# Patient Record
Sex: FEMALE | Race: BLACK OR AFRICAN AMERICAN | NOT HISPANIC OR LATINO | Employment: UNEMPLOYED | URBAN - METROPOLITAN AREA
[De-identification: names, ages, dates, MRNs, and addresses within clinical notes are randomized per-mention and may not be internally consistent; named-entity substitution may affect disease eponyms.]

---

## 2017-04-07 PROCEDURE — 99284 EMERGENCY DEPT VISIT MOD MDM: CPT

## 2017-04-08 ENCOUNTER — HOSPITAL ENCOUNTER (EMERGENCY)
Facility: HOSPITAL | Age: 42
Discharge: HOME | End: 2017-04-08
Attending: EMERGENCY MEDICINE
Payer: MEDICAID

## 2017-04-08 VITALS
HEIGHT: 65 IN | WEIGHT: 180 LBS | RESPIRATION RATE: 16 BRPM | HEART RATE: 72 BPM | BODY MASS INDEX: 29.99 KG/M2 | TEMPERATURE: 98 F | DIASTOLIC BLOOD PRESSURE: 68 MMHG | OXYGEN SATURATION: 100 % | SYSTOLIC BLOOD PRESSURE: 141 MMHG

## 2017-04-08 DIAGNOSIS — R60.9 SWELLING: ICD-10-CM

## 2017-04-08 DIAGNOSIS — M54.32 SCIATICA OF LEFT SIDE: Primary | ICD-10-CM

## 2017-04-08 PROCEDURE — 63600175 PHARM REV CODE 636 W HCPCS: Performed by: EMERGENCY MEDICINE

## 2017-04-08 RX ORDER — METHYLPREDNISOLONE 4 MG/1
TABLET ORAL
Qty: 1 PACKAGE | Refills: 0 | Status: SHIPPED | OUTPATIENT
Start: 2017-04-08 | End: 2017-04-16

## 2017-04-08 RX ORDER — PREDNISONE 20 MG/1
60 TABLET ORAL
Status: COMPLETED | OUTPATIENT
Start: 2017-04-08 | End: 2017-04-08

## 2017-04-08 RX ORDER — HYDROCODONE BITARTRATE AND ACETAMINOPHEN 10; 325 MG/1; MG/1
TABLET ORAL
COMMUNITY
End: 2017-04-16

## 2017-04-08 RX ADMIN — PREDNISONE 60 MG: 20 TABLET ORAL at 03:04

## 2017-04-08 NOTE — ED NOTES
Patient complaints of left lower back pain radiating down leg for several days, worse today.  Patient states when she stands her left knee swells and pain increases to calf area.  No shortness of breath, no chest pain.  No swelling at present time.

## 2017-04-08 NOTE — ED PROVIDER NOTES
"Encounter Date: 4/7/2017       History     Chief Complaint   Patient presents with    Leg Swelling     LLE swelling and pain x 2 days with no history of trauma.      Review of patient's allergies indicates:   Allergen Reactions    Compazine [prochlorperazine] Other (See Comments)     "I had a stroke-like reaction"     HPI Comments: 41-year-old female with a history of sciatica comes in with pain that started in her left calf radiated up into her left knee and up into her left thigh.  She describes the pain as aching, and burning.  She does have a history of sciatica.  Her sister had a blood clot, which got her worried that she might have a DVT.  She denies shortness of breath chest pain or hemoptysis.  She has no medical problems, and does not take any medicines on a regular basis.    Patient is a 41 y.o. female presenting with the following complaint: leg pain. The history is provided by the patient.   Leg Pain    There was no injury mechanism. The pain is present in the left leg. The quality of the pain is described as aching and burning. The pain is at a severity of 2/10. The pain has been constant since onset. Pertinent negatives include no inability to bear weight. She reports no foreign bodies present.     Past Medical History:   Diagnosis Date    Chronic back pain      History reviewed. No pertinent surgical history.  History reviewed. No pertinent family history.  Social History   Substance Use Topics    Smoking status: Current Every Day Smoker    Smokeless tobacco: None    Alcohol use Yes     Review of Systems   Eyes: Negative.    Respiratory: Negative.    Endocrine: Negative.    All other systems reviewed and are negative.      Physical Exam   Initial Vitals   BP Pulse Resp Temp SpO2   04/08/17 0008 04/08/17 0008 04/08/17 0008 04/08/17 0008 04/08/17 0008   155/84 90 16 98.7 °F (37.1 °C) 97 %     Physical Exam    Nursing note and vitals reviewed.  Constitutional: She appears well-developed and " well-nourished. She appears distressed.   HENT:   Head: Normocephalic and atraumatic.   Eyes: EOM are normal. Pupils are equal, round, and reactive to light.   Neck: Normal range of motion. Neck supple.   Cardiovascular: Normal rate and normal heart sounds.   Pulmonary/Chest: Breath sounds normal.   Abdominal: Soft.   Musculoskeletal: Normal range of motion.   She has neural tension signs.  She does have 5 out of 5 strength with hip flexion knee extension and ankle dorsiflexion and EHL.  She is able to walk on her tiptoes though it is painful.   Neurological: She is alert and oriented to person, place, and time. She has normal strength. No cranial nerve deficit.   Skin: Skin is warm and dry.         ED Course   Procedures  Labs Reviewed - No data to display          Medical Decision Making:   Initial Assessment:   41-year-old female with left leg pain, she was concern for DVT there is no evidence of cellulitis or swelling but she does have signs consistent with sciatica  Differential Diagnosis:   Sciatica no evidence of DVT or cellulitis  ED Management:  Is given a Medrol Dosepak, physical therapy and an outpatient referral.  I discussed with her negative ultrasound.  She expressed understanding and willingness compartment recommendations for medications physical therapy and outpatient follow-up.                   ED Course     Clinical Impression:   The primary encounter diagnosis was Sciatica of left side. A diagnosis of Swelling was also pertinent to this visit.          Elizabeth Garza MD  04/08/17 0252

## 2017-04-08 NOTE — DISCHARGE INSTRUCTIONS
Causes of Lumbar (Low Back) Pain  Low back pain can be caused by problems with any part of the lumbar spine. A disk can herniate (push out) and press on a nerve. Vertebrae can rub against each other or slip out of place. This can irritate facet joints and nerves. It can also lead to stenosis, a narrowing of the spinal canal or foramen.  Pressure from a disk  Constant wear and tear on a disk can cause it to weaken and push outward. Part of the disk may then press on nearby nerves. There are two common types of herniated disks:  Contained means the soft nucleus is protruding outward.   Extruded means the firm annulus has torn, letting the soft center squeeze through.     Pressure from bone  An unstable spine   With age, a disk may thin and wear out. Vertebrae above and below the disk may begin to touch. This can put pressure on nerves. It can also cause bone spurs (growths) to form where the bones rub together.    Stenosis results when bone spurs narrow the foramen or spinal canal. This also puts pressure on nerves. Slipping vertebrae can irritate nerves and joints. They can also worsen stenosis.    In some cases, vertebrae become unstable and slip forward. This is called spondylolisthesis.     Date Last Reviewed: 10/12/2015  © 7805-7451 [employer]. [address] [place] [address], [place] [address]. All rights reserved. This information is not intended as a substitute for professional medical care. Always follow your healthcare professional's instructions.

## 2017-04-08 NOTE — ED AVS SNAPSHOT
[facility]  [address] [place] [address]               Fani Calvo   2017  1:44 AM   ED    Description:  Female : 1975   Department:  [facility]           Your Care was Coordinated By:     Provider Role From To    Elizabeth Garza MD Attending Provider 17 0240 --      Reason for Visit     Leg Swelling           Diagnoses this Visit        Comments    Sciatica of left side    -  Primary     Swelling           ED Disposition     None           To Do List           Follow-up Information     Follow up with [facility] In 1 week.    Specialty:  Family Medicine    Contact information:    [address], [address]  [facility] [address] [phone]       These Medications        Disp Refills Start End    methylPREDNISolone (MEDROL DOSEPACK) 4 mg tablet 1 Package 0 2017    Take as prescribed      [facility]     [facility] Nurse Care Line -  Assistance  Unless otherwise directed by your provider, please contact [facility], our nurse care line that is available for  assistance.     Registered nurses in the [facility] provide: appointment scheduling, clinical advisement, health education, and other advisory services.  Call: [phone] (toll free)               Medications           Message regarding Medications     Verify the changes and/or additions to your medication regime listed below are the same as discussed with your clinician today.  If any of these changes or additions are incorrect, please notify your healthcare provider.        START taking these NEW medications        Refills    methylPREDNISolone (MEDROL DOSEPACK) 4 mg tablet 0    Sig: Take as prescribed    Class: Print           Verify that the below list of medications is an accurate representation of the medications you are currently taking.  If none reported, the list may be blank.  "If incorrect, please contact your healthcare provider. Carry this list with you in case of emergency.           Current Medications     hydrocodone-acetaminophen 10-325mg (NORCO)  mg Tab Take by mouth.    ibuprofen (ADVIL,MOTRIN) 800 MG tablet Take 1 tablet (800 mg total) by mouth every 6 (six) hours as needed (mild pain).    methylPREDNISolone (MEDROL DOSEPACK) 4 mg tablet Take as prescribed           Clinical Reference Information           Your Vitals Were     BP Pulse Temp Resp Height Weight    155/84 (BP Location: Right arm, Patient Position: Sitting) 90 98.7 °F (37.1 °C) (Oral) 16 5' 5" (1.651 m) 81.6 kg (180 lb)    Last Period SpO2 BMI          03/10/2017 97% 29.95 kg/m2        Allergies as of 4/8/2017        Reactions    Compazine [Prochlorperazine] Other (See Comments)    "I had a stroke-like reaction"      Immunizations Administered on Date of Encounter - 4/8/2017     None      ED Micro, Lab, POCT     None      ED Imaging Orders     Start Ordered       Status Ordering Provider    04/08/17 0033 04/08/17 0032  US Lower Extremity Veins Left  1 time imaging      Final result         Discharge Instructions         Causes of Lumbar (Low Back) Pain  Low back pain can be caused by problems with any part of the lumbar spine. A disk can herniate (push out) and press on a nerve. Vertebrae can rub against each other or slip out of place. This can irritate facet joints and nerves. It can also lead to stenosis, a narrowing of the spinal canal or foramen.  Pressure from a disk  Constant wear and tear on a disk can cause it to weaken and push outward. Part of the disk may then press on nearby nerves. There are two common types of herniated disks:  Contained means the soft nucleus is protruding outward.   Extruded means the firm annulus has torn, letting the soft center squeeze through.     Pressure from bone  An unstable spine   With age, a disk may thin and wear out. Vertebrae above and below the disk may begin to " touch. This can put pressure on nerves. It can also cause bone spurs (growths) to form where the bones rub together.    Stenosis results when bone spurs narrow the foramen or spinal canal. This also puts pressure on nerves. Slipping vertebrae can irritate nerves and joints. They can also worsen stenosis.    In some cases, vertebrae become unstable and slip forward. This is called spondylolisthesis.     Date Last Reviewed: 10/12/2015  © 7692-8563 [employer]. [address] [place] [address], [place] [address]. All rights reserved. This information is not intended as a substitute for professional medical care. Always follow your healthcare professional's instructions.          [facility] Sign-Up     Activating your [facility] account is as easy as 1-2-3!     1) Visit [email or web address], select Sign Up Now, enter this activation code and your date of birth, then select Next.  [email or web address]  Expires: 5/23/2017  2:48 AM      2) Create a username and password to use when you visit [facility] in the future and select a security question in case you lose your password and select Next.    3) Enter your e-mail address and click Sign Up!    Additional Information  If you have questions, please e-mail [email or web address] or call [phone] to talk to our [facility] staff. Remember, [facility] is NOT to be used for urgent needs. For medical emergencies, dial 911.         Smoking Cessation     If you would like to quit smoking:   You may be eligible for free services if you are a [place] resident and started smoking cigarettes before September 1, 1988.  Call the Smoking Cessation Trust (SCT) toll free at (733) 995-5354 or (356) 562-1664.   Call -800-QUIT-NOW if you do not meet the above criteria.   Contact us via email: [email or web address]   View our website for more information: [email or web address]         [facility] complies with applicable Federal civil rights laws and does not  discriminate on the basis of race, color, national origin, age, disability, or sex.        Language Assistance Services     ATTENTION: Language assistance services are available, free of charge. Please call [phone].      ATENCIÓN: Si sari dubois, tiene a mclean disposición servicios gratuitos de asistencia lingüística. Llame al [phone].     CHÚ Ý: N?u b?n nói Ti?ng Vi?t, có các d?ch v? h? tr? ngôn ng? mi?n phí dành cho b?n. G?i s? [phone].

## 2017-04-16 ENCOUNTER — HOSPITAL ENCOUNTER (EMERGENCY)
Facility: OTHER | Age: 42
Discharge: HOME | End: 2017-04-16
Attending: EMERGENCY MEDICINE
Payer: MEDICAID

## 2017-04-16 VITALS
RESPIRATION RATE: 18 BRPM | HEART RATE: 88 BPM | OXYGEN SATURATION: 96 % | TEMPERATURE: 98 F | WEIGHT: 180 LBS | HEIGHT: 65 IN | DIASTOLIC BLOOD PRESSURE: 66 MMHG | BODY MASS INDEX: 29.99 KG/M2 | SYSTOLIC BLOOD PRESSURE: 114 MMHG

## 2017-04-16 DIAGNOSIS — R52 PAIN: ICD-10-CM

## 2017-04-16 DIAGNOSIS — M79.605 LEFT LEG PAIN: ICD-10-CM

## 2017-04-16 DIAGNOSIS — M54.42 ACUTE MIDLINE LOW BACK PAIN WITH LEFT-SIDED SCIATICA: Primary | ICD-10-CM

## 2017-04-16 LAB
B-HCG UR QL: NEGATIVE
CTP QC/QA: YES

## 2017-04-16 PROCEDURE — 25000003 PHARM REV CODE 250: Performed by: EMERGENCY MEDICINE

## 2017-04-16 PROCEDURE — 63600175 PHARM REV CODE 636 W HCPCS: Performed by: EMERGENCY MEDICINE

## 2017-04-16 PROCEDURE — 96372 THER/PROPH/DIAG INJ SC/IM: CPT

## 2017-04-16 PROCEDURE — 99284 EMERGENCY DEPT VISIT MOD MDM: CPT | Mod: 25

## 2017-04-16 PROCEDURE — 81025 URINE PREGNANCY TEST: CPT | Performed by: EMERGENCY MEDICINE

## 2017-04-16 RX ORDER — OXYCODONE AND ACETAMINOPHEN 5; 325 MG/1; MG/1
1 TABLET ORAL
Status: COMPLETED | OUTPATIENT
Start: 2017-04-16 | End: 2017-04-16

## 2017-04-16 RX ORDER — CYCLOBENZAPRINE HCL 10 MG
10 TABLET ORAL
Status: COMPLETED | OUTPATIENT
Start: 2017-04-16 | End: 2017-04-16

## 2017-04-16 RX ORDER — OXYCODONE AND ACETAMINOPHEN 5; 325 MG/1; MG/1
1 TABLET ORAL EVERY 4 HOURS PRN
Qty: 12 TABLET | Refills: 0 | Status: SHIPPED | OUTPATIENT
Start: 2017-04-16 | End: 2017-04-23 | Stop reason: ALTCHOICE

## 2017-04-16 RX ORDER — KETOROLAC TROMETHAMINE 30 MG/ML
15 INJECTION, SOLUTION INTRAMUSCULAR; INTRAVENOUS
Status: COMPLETED | OUTPATIENT
Start: 2017-04-16 | End: 2017-04-16

## 2017-04-16 RX ORDER — HYDROMORPHONE HYDROCHLORIDE 1 MG/ML
1 INJECTION, SOLUTION INTRAMUSCULAR; INTRAVENOUS; SUBCUTANEOUS
Status: COMPLETED | OUTPATIENT
Start: 2017-04-16 | End: 2017-04-16

## 2017-04-16 RX ORDER — IBUPROFEN 600 MG/1
600 TABLET ORAL EVERY 6 HOURS PRN
Qty: 20 TABLET | Refills: 0 | Status: SHIPPED | OUTPATIENT
Start: 2017-04-16 | End: 2017-10-25

## 2017-04-16 RX ORDER — CYCLOBENZAPRINE HCL 10 MG
10 TABLET ORAL 3 TIMES DAILY PRN
Qty: 15 TABLET | Refills: 0 | Status: SHIPPED | OUTPATIENT
Start: 2017-04-16 | End: 2017-04-21

## 2017-04-16 RX ADMIN — HYDROMORPHONE HYDROCHLORIDE 1 MG: 1 INJECTION, SOLUTION INTRAMUSCULAR; INTRAVENOUS; SUBCUTANEOUS at 04:04

## 2017-04-16 RX ADMIN — CYCLOBENZAPRINE HYDROCHLORIDE 10 MG: 10 TABLET, FILM COATED ORAL at 03:04

## 2017-04-16 RX ADMIN — OXYCODONE HYDROCHLORIDE AND ACETAMINOPHEN 1 TABLET: 5; 325 TABLET ORAL at 03:04

## 2017-04-16 RX ADMIN — KETOROLAC TROMETHAMINE 15 MG: 30 INJECTION, SOLUTION INTRAMUSCULAR at 03:04

## 2017-04-16 NOTE — ED PROVIDER NOTES
"Encounter Date: 4/16/2017    SCRIBE #1 NOTE: I, Marc Zabrina, am scribing for, and in the presence of, Dr. Rosa.       History     Chief Complaint   Patient presents with    Leg Pain     L leg x1 week, visity to [facility] last thurs, reports no relief from syptoms     Review of patient's allergies indicates:   Allergen Reactions    Compazine [prochlorperazine] Other (See Comments)     "I had a stroke-like reaction"     HPI Comments: Time seen by provider: 2:55 PM    This is a 41 y.o. female who presents to the ED with a chief complaint of left lower extremity pain. The patient reports that she was seen in the ED and dx with sciatica 8 days ago at onset and discharged on a medrol dose pack. She reports that her pain has been persistent in that time. She states that it was initially to her calf, but now is to the back of her calf radiating up her posterior leg up to her buttock. She describes it as a constant shooting and burning pain rated at a 10/10 in severity. She notes some associated intermittent numbness and burning to her left foot. She denies any injury, trauma, or falls. She reports no relief with steroid or aleve use. She states that her left lower extremity is mildly swollen     The patient denies any fecal or urinary incontinence, abdominal pain, fever, or chills.     She denies any HTN, DM, or asthma. She reports no drug use. She denies any past surgical hx.     The history is provided by the patient.     Past Medical History:   Diagnosis Date    Chronic back pain      History reviewed. No pertinent surgical history.  History reviewed. No pertinent family history.  Social History   Substance Use Topics    Smoking status: Current Every Day Smoker    Smokeless tobacco: None    Alcohol use Yes     Review of Systems   Constitutional: Negative for chills and fever.   HENT: Negative for sore throat.    Respiratory: Negative for shortness of breath.    Cardiovascular: Negative for chest pain. "   Gastrointestinal: Negative for abdominal pain and nausea.   Genitourinary: Negative for dysuria.   Musculoskeletal: Negative for back pain.        Positive for left lower extremity pain.   Skin: Negative for rash.   Neurological: Positive for numbness (intermittently to left foot). Negative for weakness.        Negative for fecal or urinary incontinence.   Hematological: Does not bruise/bleed easily.       Physical Exam   Initial Vitals   BP Pulse Resp Temp SpO2   04/16/17 1451 04/16/17 1451 04/16/17 1451 04/16/17 1451 04/16/17 1451   122/90 107 18 98.3 °F (36.8 °C) 99 %     Physical Exam    Nursing note and vitals reviewed.  Constitutional: She appears well-developed and well-nourished. She is not diaphoretic. No distress.   HENT:   Head: Normocephalic and atraumatic.   Right Ear: External ear normal.   Left Ear: External ear normal.   Nose: Nose normal.   Mouth/Throat: Oropharynx is clear and moist.   Eyes: Conjunctivae and EOM are normal. Pupils are equal, round, and reactive to light. Right eye exhibits no discharge. Left eye exhibits no discharge.   Neck: Normal range of motion.   Cardiovascular: Normal rate, regular rhythm and normal heart sounds. Exam reveals no gallop and no friction rub.    No murmur heard.  Pulmonary/Chest: Breath sounds normal. No respiratory distress. She has no wheezes. She has no rhonchi. She has no rales.   Abdominal: Soft. There is no tenderness. There is no rebound and no guarding.   Musculoskeletal: Normal range of motion.   Point TTP over the left mid buttock area and sacrum. No other spinal TTP.   Left lower extremity: DP pulses are 1+ and equal. Palpable tender cystic area to the popliteal fossa of the left knee. No large effusion. Knee stable with varus and valgus stress. Pain with knee flexion. Diffuse left knee TTP. No erythema or skin changes.    Neurological: She is alert and oriented to person, place, and time. She has normal strength. No cranial nerve deficit or sensory  deficit.   Skin: Skin is warm and dry. No rash and no abscess noted. No erythema. No pallor.   Psychiatric: She has a normal mood and affect. Her behavior is normal. Judgment and thought content normal.         ED Course   Procedures  Labs Reviewed   POCT URINE PREGNANCY         Imaging Results         US Lower Extremity Veins Left (Final result) Result time:  04/16/17 16:09:30    Final result by Aroldo Patel MD (04/16/17 16:09:30)    Impression:        No evidence of deep venous thrombosis in the left lower extremity.        Electronically signed by: AROLDO PATEL MD  Date:     04/16/17  Time:    16:09     Narrative:    Lower extremity US    Time of Procedure: 04/16/17 15:36:39    Accession # [id number]    CLINICAL INDICATION: 41 year old F with   pain    TECHNIQUE: Duplex and color flow Doppler evaluation of the left lower extremity.    COMPARISON: 04/08/2017    FINDINGS:    There is no evidence of acute venous thrombosis in the left common femoral, superficial femoral, greater saphenous, popliteal, peroneal, anterior tibial and posterior tibial veins.            X-Ray Knee 3 View Left (Final result) Result time:  04/16/17 15:42:38    Final result by Aroldo Patel MD (04/16/17 15:42:38)    Impression:          No evidence of fracture or dislocation      Electronically signed by: AROLDO PATEL MD  Date:     04/16/17  Time:    15:42     Narrative:    XR knee    04/16/17 15:30:00    Accession# [id number]    CLINICAL INDICATION: 41 year old F with  pain    TECHNIQUE: 3 views of the left knee.    COMPARISON: Correlation made with tibia fibula radiograph dated 10/03/2011    FINDINGS:    Osseous structures appear intact without evidence of a displaced fracture or destructive lesion.  No evidence of dislocation.  No significant degenerative changes.  Joint spaces are preserved.  No joint effusion.  No focal soft tissue swelling or radiopaque foreign body.                  X-Rays:   Independently Interpreted Readings:    Other Readings:  Left knee x-ray: No fracture, dislocation, or acute process visualized.     Medical Decision Making:   History:   Old Records Summarized: records from clinic visits.       <> Summary of Records: She was seen in the ED and dx with sciatica 8 days ago and discharged on a medrol dose pack.    ED Management:  Emergent evaluation a 41-year-old female with complaint of leg pain buttock pain and back pain.  Description of pain sounds consistent with sciatica, except for out of proportion knee pain.  On exam there is tenderness to the popliteal fossa.  Ultrasound shows no Baker cyst or DVT.  X-ray of the knee shows no fracture or bony lesion.  She was treated with analgesics and muscle relaxers, encouraged close follow-up with PCP or return for worsening.  There was no sign or symptom of cord compression or cauda equina syndrome, and I am comfortable with close follow-up.  She was advised to return for any new or worsening symptoms.            Scribe Attestation:   Scribe #1: I performed the above scribed service and the documentation accurately describes the services I performed. I attest to the accuracy of the note.    Attending Attestation:           Physician Attestation for Scribe:  Physician Attestation Statement for Scribe #1: I, Dr. Rosa, reviewed documentation, as scribed by Marc Gerber in my presence, and it is both accurate and complete.                 ED Course     Clinical Impression:     1. Acute midline low back pain with left-sided sciatica    2. Pain    3. Left leg pain                Joseline Rosa MD  04/19/17 9896

## 2017-04-16 NOTE — ED TRIAGE NOTES
Pt reports with c/o left leg pain and swelling for the past week, visit to [facility] last Thurs where they did an Ultrasound, no other treatment given but pt reports no relief from symptoms since; denies any falls/injuries; pain start around left calf, goes down to foot and back up left leg to buttocks and left lower back;

## 2017-04-16 NOTE — ED AVS SNAPSHOT
[facility]  [address]  [facility] [address]               Fani Calvo   2017  2:52 PM   ED    Description:  Female : 1975   Department:  [facility]           Your Care was Coordinated By:     Provider Role From To    Joseline Rosa MD Attending Provider 17 0273 --      Reason for Visit     Leg Pain           Diagnoses this Visit        Comments    Acute midline low back pain with left-sided sciatica    -  Primary     Pain         Left leg pain           ED Disposition     None           To Do List           Follow-up Information     Schedule an appointment as soon as possible for a visit with Yelena.    Specialties:  Behavioral Health, Psychiatry    Why:  or the primary care doctor or community clinic of your choice    Contact information:    [address]  [facility] [address]  [phone]          Follow up with [facility].    Specialty:  Emergency Medicine    Why:  As needed, If symptoms worsen    Contact information:    [address]  [facility] [id number] [phone]       These Medications        Disp Refills Start End    ibuprofen (ADVIL,MOTRIN) 600 MG tablet 20 tablet 0 2017     Take 1 tablet (600 mg total) by mouth every 6 (six) hours as needed for Pain. - Oral    oxycodone-acetaminophen (PERCOCET) 5-325 mg per tablet 12 tablet 0 2017     Take 1 tablet by mouth every 4 (four) hours as needed for Pain. - Oral    cyclobenzaprine (FLEXERIL) 10 MG tablet 15 tablet 0 2017    Take 1 tablet (10 mg total) by mouth 3 (three) times daily as needed for Muscle spasms. - Oral      [facility]     [facility] Nurse Care [facility] - 24/7 Assistance  Unless otherwise directed by your provider, please contact [facility], our nurse care line that is available for 24/7 assistance.     Registered nurses in the [facility] provide:  appointment scheduling, clinical advisement, health education, and other advisory services.  Call: [phone] (toll free)               Medications           Message regarding Medications     Verify the changes and/or additions to your medication regime listed below are the same as discussed with your clinician today.  If any of these changes or additions are incorrect, please notify your healthcare provider.        START taking these NEW medications        Refills    ibuprofen (ADVIL,MOTRIN) 600 MG tablet 0    Sig: Take 1 tablet (600 mg total) by mouth every 6 (six) hours as needed for Pain.    Class: Print    Route: Oral    oxycodone-acetaminophen (PERCOCET) 5-325 mg per tablet 0    Sig: Take 1 tablet by mouth every 4 (four) hours as needed for Pain.    Class: Print    Route: Oral    cyclobenzaprine (FLEXERIL) 10 MG tablet 0    Sig: Take 1 tablet (10 mg total) by mouth 3 (three) times daily as needed for Muscle spasms.    Class: Print    Route: Oral      These medications were administered today        Dose Freq    ketorolac injection 15 mg 15 mg ED 1 Time    Sig: Inject 15 mg into the muscle ED 1 Time.    Class: Normal    Route: Intramuscular    cyclobenzaprine tablet 10 mg 10 mg ED 1 Time    Sig: Take 1 tablet (10 mg total) by mouth ED 1 Time.    Class: Normal    Route: Oral    oxycodone-acetaminophen 5-325 mg per tablet 1 tablet 1 tablet ED 1 Time    Sig: Take 1 tablet by mouth ED 1 Time.    Class: Normal    Route: Oral    hydromorphone injection 1 mg 1 mg ED 1 Time    Sig: Inject 1 mL (1 mg total) into the muscle ED 1 Time.    Class: Normal    Route: Intramuscular      STOP taking these medications     methylPREDNISolone (MEDROL DOSEPACK) 4 mg tablet Take as prescribed    hydrocodone-acetaminophen 10-325mg (NORCO)  mg Tab Take by mouth.           Verify that the below list of medications is an accurate representation of the medications you are currently taking.  If none reported, the list may be  "blank. If incorrect, please contact your healthcare provider. Carry this list with you in case of emergency.           Current Medications     cyclobenzaprine (FLEXERIL) 10 MG tablet Take 1 tablet (10 mg total) by mouth 3 (three) times daily as needed for Muscle spasms.    ibuprofen (ADVIL,MOTRIN) 600 MG tablet Take 1 tablet (600 mg total) by mouth every 6 (six) hours as needed for Pain.    oxycodone-acetaminophen (PERCOCET) 5-325 mg per tablet Take 1 tablet by mouth every 4 (four) hours as needed for Pain.           Clinical Reference Information           Your Vitals Were     BP Pulse Temp Resp Height Weight    122/90 107 98.3 °F (36.8 °C) (Oral) 18 5' 5" (1.651 m) 81.6 kg (180 lb)    Last Period SpO2 BMI          04/11/2017 99% 29.95 kg/m2        Allergies as of 4/16/2017        Reactions    Compazine [Prochlorperazine] Other (See Comments)    "I had a stroke-like reaction"      Immunizations Administered on Date of Encounter - 4/16/2017     None      ED Micro, Lab, POCT     Start Ordered       Status Ordering Provider    04/16/17 1453 04/16/17 1452  POCT urine pregnancy  Once      Final result       ED Imaging Orders     Start Ordered       Status Ordering Provider    04/16/17 1516 04/16/17 1516  X-Ray Knee 3 View Left  1 time imaging      Final result     04/16/17 1516 04/16/17 1516  US Lower Extremity Veins Left  1 time imaging      Final result       Discharge References/Attachments     BACK PAIN (ACUTE OR CHRONIC) (ENGLISH)    SCIATICA (ENGLISH)      [facility] Sign-Up     Activating your [facility] account is as easy as 1-2-3!     1) Visit [email or web address], select Sign Up Now, enter this activation code and your date of birth, then select Next.  [email or web address]  Expires: 5/23/2017  2:48 AM      2) Create a username and password to use when you visit [facility] in the future and select a security question in case you lose your password and select Next.    3) Enter your e-mail address and click Sign " Up!    Additional Information  If you have questions, please e-mail [email or web address] or call [phone] to talk to our [email or web address] staff. Remember, [facility] is NOT to be used for urgent needs. For medical emergencies, dial 911.         Smoking Cessation     If you would like to quit smoking:   You may be eligible for free services if you are a [place] resident and started smoking cigarettes before September 1, 1988.  Call the Smoking Cessation Trust (RUST) toll free at (014) 002-6997 or (768) 311-3061.   Call 5-568-QUIT-NOW if you do not meet the above criteria.   Contact us via email: [email or web address]   View our website for more information: [email or web address]         [facility] complies with applicable Federal civil rights laws and does not discriminate on the basis of race, color, national origin, age, disability, or sex.        Language Assistance Services     ATTENTION: Language assistance services are available, free of charge. Please call [phone].      ATENCIÓN: Si habla español, tiene a mclean disposición servicios gratuitos de asistencia lingüística. Llame al [phone].     CHÚ Ý: N?u b?n nói Ti?ng Vi?t, có các d?ch v? h? tr? ngôn ng? mi?n phí dành cho b?n. G?i s? [phone].

## 2017-04-23 ENCOUNTER — HOSPITAL ENCOUNTER (EMERGENCY)
Facility: OTHER | Age: 42
Discharge: HOME | End: 2017-04-23
Attending: EMERGENCY MEDICINE
Payer: MEDICAID

## 2017-04-23 VITALS
RESPIRATION RATE: 16 BRPM | WEIGHT: 180 LBS | HEIGHT: 65 IN | HEART RATE: 78 BPM | OXYGEN SATURATION: 98 % | SYSTOLIC BLOOD PRESSURE: 122 MMHG | BODY MASS INDEX: 29.99 KG/M2 | TEMPERATURE: 98 F | DIASTOLIC BLOOD PRESSURE: 70 MMHG

## 2017-04-23 DIAGNOSIS — M79.605 PAIN OF LEFT LOWER EXTREMITY: Primary | ICD-10-CM

## 2017-04-23 LAB
B-HCG UR QL: NEGATIVE
CTP QC/QA: YES

## 2017-04-23 PROCEDURE — 99283 EMERGENCY DEPT VISIT LOW MDM: CPT | Mod: 25

## 2017-04-23 PROCEDURE — 81025 URINE PREGNANCY TEST: CPT | Performed by: PHYSICIAN ASSISTANT

## 2017-04-23 PROCEDURE — 96372 THER/PROPH/DIAG INJ SC/IM: CPT

## 2017-04-23 PROCEDURE — 63600175 PHARM REV CODE 636 W HCPCS: Performed by: PHYSICIAN ASSISTANT

## 2017-04-23 RX ORDER — OXYCODONE AND ACETAMINOPHEN 5; 325 MG/1; MG/1
1 TABLET ORAL EVERY 4 HOURS PRN
Qty: 10 TABLET | Refills: 0 | Status: SHIPPED | OUTPATIENT
Start: 2017-04-23 | End: 2017-05-01

## 2017-04-23 RX ORDER — HYDROMORPHONE HYDROCHLORIDE 2 MG/ML
2 INJECTION, SOLUTION INTRAMUSCULAR; INTRAVENOUS; SUBCUTANEOUS
Status: COMPLETED | OUTPATIENT
Start: 2017-04-23 | End: 2017-04-23

## 2017-04-23 RX ORDER — KETOROLAC TROMETHAMINE 30 MG/ML
15 INJECTION, SOLUTION INTRAMUSCULAR; INTRAVENOUS
Status: COMPLETED | OUTPATIENT
Start: 2017-04-23 | End: 2017-04-23

## 2017-04-23 RX ADMIN — KETOROLAC TROMETHAMINE 15 MG: 30 INJECTION, SOLUTION INTRAMUSCULAR at 02:04

## 2017-04-23 RX ADMIN — HYDROMORPHONE HYDROCHLORIDE 2 MG: 2 INJECTION, SOLUTION INTRAMUSCULAR; INTRAVENOUS; SUBCUTANEOUS at 02:04

## 2017-04-23 NOTE — ED NOTES
"Pt presenting to ED with c/o L leg pain located at the knee, stating "it feels like warm water is running down the back of my leg." Pt reporting pain is constant, sharp, and shooting. Pt is tearful stating "i think yall are overlooking something." Sensation and DP pulses intact to bilat. LE. Pt AAOx4 and appropriate at this time. Respirations even and unlabored. No acute distress noted. Pt reports pain 10/10.   "

## 2017-04-23 NOTE — ED PROVIDER NOTES
"Encounter Date: 4/23/2017       History     Chief Complaint   Patient presents with    Knee Pain     reports left knee pain x 1 month     Review of patient's allergies indicates:   Allergen Reactions    Compazine [prochlorperazine] Other (See Comments)     "I had a stroke-like reaction"     HPI Comments: Patient is a 41-year-old female with history of chronic back pain who presents to the emergency department with left leg pain.  Patient states over the last several weeks she has had severe pain in the left leg.  Patient denies any injury.  Patient states several years ago she was diagnosed with sciatica.  Patient states she was seen in the emergency department a couple of weeks ago and was diagnosed with sciatica again.  Patient states she was given anti-inflammatories and sent home.  Patient states she was seen in the emergency department here on April 16.  Patient states she had an x-ray done of her knee and an ultrasound of the left leg showing no abnormalities.  Patient states the pain is progressively worsened.  Patient rates her pain 10 out of 10.  Patient states while sitting at [employer] today, the pain significantly worsened.  Patient states she is able to bear weight, but the pain is very severe.  Patient states she feels the pain radiating from her left buttocks all the way down to the left foot.  Patient denies any saddle anesthesia or bowel or bladder incontinence or retention.  Patient denies exogenous estrogen.  She denies recent travel, recent surgery, previous blood clot, or lower extremity edema.  She states that the pain is a burning, warm sensation that is sharp in nature.    The history is provided by the patient.     Past Medical History:   Diagnosis Date    Chronic back pain      History reviewed. No pertinent surgical history.  History reviewed. No pertinent family history.  Social History   Substance Use Topics    Smoking status: Current Every Day Smoker    Smokeless tobacco: None    " Alcohol use No     Review of Systems   Constitutional: Negative for activity change, appetite change, chills, fatigue and fever.   HENT: Negative for congestion, ear discharge, ear pain, hearing loss, mouth sores, rhinorrhea, sinus pressure, sore throat and trouble swallowing.    Eyes: Negative for photophobia and visual disturbance.   Respiratory: Negative for cough and shortness of breath.    Cardiovascular: Negative for chest pain.   Gastrointestinal: Negative for abdominal pain, blood in stool, constipation, diarrhea, nausea and vomiting.   Genitourinary: Negative for dysuria, flank pain and hematuria.   Musculoskeletal: Negative for back pain, neck pain and neck stiffness.        Left leg pain   Skin: Negative for rash and wound.   Neurological: Negative for dizziness, syncope, speech difficulty, weakness, light-headedness, numbness and headaches.       Physical Exam   Initial Vitals   BP Pulse Resp Temp SpO2   04/23/17 1231 04/23/17 1231 04/23/17 1231 04/23/17 1231 04/23/17 1231   131/85 108 18 98.3 °F (36.8 °C) 98 %     Physical Exam    Nursing note and vitals reviewed.  Constitutional: She appears well-developed and well-nourished. She is not diaphoretic.  Non-toxic appearance. No distress.   HENT:   Head: Normocephalic.   Right Ear: Hearing and external ear normal.   Left Ear: Hearing and external ear normal.   Nose: Nose normal.   Mouth/Throat: Uvula is midline and oropharynx is clear and moist. No trismus in the jaw. No uvula swelling. No oropharyngeal exudate.   Eyes: Conjunctivae are normal. Pupils are equal, round, and reactive to light.   Neck: Normal range of motion.   Cardiovascular: Normal rate and regular rhythm.   Pulmonary/Chest: Breath sounds normal. No respiratory distress. She has no wheezes. She has no rhonchi. She has no rales. She exhibits no tenderness.   Abdominal: Soft. Bowel sounds are normal. She exhibits no distension and no mass. There is no tenderness. There is no rebound and no  guarding.   Musculoskeletal:   No midline tenderness in the cervical, thoracic, or lumbar region.  No step-offs or crepitus noted.  No ecchymosis.  Paraspinal tenderness in the left lumbar region.  Tenderness over the left buttocks.  Positive straight leg test in the left lower extremity.  No saddle anesthesia.  Normal strength and reflexes in the lower extremities.  No swelling or asymmetry of the lower extremities.  No erythema or warmth.  No palpable cord.  Neurovascularly intact.   Lymphadenopathy:     She has no cervical adenopathy.   Neurological: She is alert and oriented to person, place, and time.   Skin: Skin is warm and dry.   Psychiatric: She has a normal mood and affect.         ED Course   Procedures  Labs Reviewed   POCT URINE PREGNANCY             Medical Decision Making:   Initial Assessment:   Urgent evaluation of a 41-year-old female with no significant medical history who presents the emergency department with left leg pain.  Patient is afebrile, nontoxic appearing, and hemodynamically stable.  No history of injury.  Patient meets no Wells criteria.  No midline tenderness of spine.  No evidence of vertebral fracture, spinal cord compression, cauda equina syndrome, or spinal cord abscess.  Patient's chart is reviewed and shows she had a recent x-ray of the left knee showing no acute osseous injuries.  There was also an ultrasound performed showing no Baker's cyst or DVT.  With patient's presentation, I suspect sciatic pain.  I will give anti-inflammatory and analgesic and reevaluate.  ED Management:  2:51 PM  Patient states she is feeling much better.  She does requests having an MRI performed here in the emergency department.  I explained to patient that we do not obtain MRIs for these types of symptoms.  She is advised to establish care with primary care doctor who can refer her to orthopedic or back and spine clinic.  She is amenable to plan.  She is advised to return to the emergency  department with any worsening symptoms or concerns.  Other:   I have discussed this case with another health care provider.       <> Summary of the Discussion: Dr. Ballard                   ED Course     Clinical Impression:   The encounter diagnosis was Pain of left lower extremity.          Kristi Block PA-C  04/23/17 1454

## 2017-04-23 NOTE — ED AVS SNAPSHOT
[facility]  [address]  [facility] [address]               Fani Calvo   2017  1:16 PM   ED    Description:  Female : 1975   Department:  [facility]           Your Care was Coordinated By:     Provider Role From To    Carson Flores MD Attending Provider 17 [id number] --    Kristi Block PA-C Physician Assistant 17 2412 --      Reason for Visit     Knee Pain           Diagnoses this Visit        Comments    Pain of left lower extremity    -  Primary       ED Disposition     None           To Do List           Follow-up Information     Follow up with [facility].    Specialties:  Behavioral Health, Psychiatry    Contact information:    [address]  [facility] [address]  [phone]        [facility]     [facility] Care [facility] -  Assistance  Unless otherwise directed by your provider, please contact [facility], our nurse care line that is available for  assistance.     Registered nurses in the [facility] provide: appointment scheduling, clinical advisement, health education, and other advisory services.  Call: [phone] (toll free)               Medications           Message regarding Medications     Verify the changes and/or additions to your medication regime listed below are the same as discussed with your clinician today.  If any of these changes or additions are incorrect, please notify your healthcare provider.        These medications were administered today        Dose Freq    hydromorphone (PF) injection 2 mg 2 mg ED 1 Time    Sig: Inject 1 mL (2 mg total) into the muscle ED 1 Time.    Class: Normal    Route: Intramuscular    ketorolac injection 15 mg 15 mg ED 1 Time    Sig: Inject 15 mg into the muscle ED 1 Time.    Class: Normal    Route: Intramuscular           Verify that the below list of medications is an accurate representation of the  "medications you are currently taking.  If none reported, the list may be blank. If incorrect, please contact your healthcare provider. Carry this list with you in case of emergency.           Current Medications     ibuprofen (ADVIL,MOTRIN) 600 MG tablet Take 1 tablet (600 mg total) by mouth every 6 (six) hours as needed for Pain.    oxycodone-acetaminophen (PERCOCET) 5-325 mg per tablet Take 1 tablet by mouth every 4 (four) hours as needed for Pain.           Clinical Reference Information           Your Vitals Were     BP Pulse Temp Resp Height Weight    118/72 84 98.3 °F (36.8 °C) (Oral) 18 5' 5" (1.651 m) 81.6 kg (180 lb)    Last Period SpO2 BMI          04/11/2017 99% 29.95 kg/m2        Allergies as of 4/23/2017        Reactions    Compazine [Prochlorperazine] Other (See Comments)    "I had a stroke-like reaction"      Immunizations Administered on Date of Encounter - 4/23/2017     None      ED Micro, Lab, POCT     Start Ordered       Status Ordering Provider    04/23/17 1349 04/23/17 1349  POCT urine pregnancy  Once      Final result       ED Imaging Orders     None      Discharge References/Attachments     SCIATICA (ENGLISH)      [facility] Sign-Up     Activating your [facility] account is as easy as 1-2-3!     1) Visit [email or web address], select Sign Up Now, enter this activation code and your date of birth, then select Next.  [email or web address]  Expires: 5/23/2017  2:48 AM      2) Create a username and password to use when you visit [facility] in the future and select a security question in case you lose your password and select Next.    3) Enter your e-mail address and click Sign Up!    Additional Information  If you have questions, please e-mail [email or web address] or call [phone] to talk to our [facility] staff. Remember, [facility] is NOT to be used for urgent needs. For medical emergencies, dial 911.         Smoking Cessation     If you would like to quit smoking:   You may be eligible for free services " if you are a [place] resident and started smoking cigarettes before September 1, 1988.  Call the Smoking Cessation Trust (SCT) toll free at (489) 903-7766 or (042) 036-2821.   Call 1-800-QUIT-NOW if you do not meet the above criteria.   Contact us via email: [email or web address]   View our website for more information: [email or web address]         [facility] complies with applicable Federal civil rights laws and does not discriminate on the basis of race, color, national origin, age, disability, or sex.        Language Assistance Services     ATTENTION: Language assistance services are available, free of charge. Please call [phone].      ATENCIÓN: Si habla shelry, tiene a mcelan disposición servicios gratuitos de asistencia lingüística. Llame al [phone].     CHÚ Ý: N?u b?n nói Ti?ng Vi?t, có các d?ch v? h? tr? ngôn ng? mi?n phí dành cho b?n. G?i s? [phone].

## 2017-05-01 ENCOUNTER — OFFICE VISIT (OUTPATIENT)
Dept: FAMILY MEDICINE | Facility: HOSPITAL | Age: 42
End: 2017-05-01
Payer: MEDICAID

## 2017-05-01 VITALS
DIASTOLIC BLOOD PRESSURE: 90 MMHG | BODY MASS INDEX: 30.85 KG/M2 | HEART RATE: 90 BPM | SYSTOLIC BLOOD PRESSURE: 132 MMHG | HEIGHT: 65 IN | WEIGHT: 185.19 LBS

## 2017-05-01 DIAGNOSIS — M25.562 LATERAL KNEE PAIN, LEFT: Primary | ICD-10-CM

## 2017-05-01 PROCEDURE — 96372 THER/PROPH/DIAG INJ SC/IM: CPT

## 2017-05-01 PROCEDURE — 99214 OFFICE O/P EST MOD 30 MIN: CPT | Mod: 25 | Performed by: FAMILY MEDICINE

## 2017-05-01 RX ORDER — CYCLOBENZAPRINE HCL 10 MG
10 TABLET ORAL 3 TIMES DAILY PRN
COMMUNITY
End: 2017-10-25

## 2017-05-01 RX ORDER — PENICILLIN V POTASSIUM 500 MG/1
TABLET, FILM COATED ORAL
Refills: 0 | COMMUNITY
Start: 2017-02-14 | End: 2019-09-05 | Stop reason: ALTCHOICE

## 2017-05-01 RX ORDER — OXYCODONE AND ACETAMINOPHEN 5; 325 MG/1; MG/1
1 TABLET ORAL EVERY 8 HOURS PRN
Qty: 40 TABLET | Refills: 0 | OUTPATIENT
Start: 2017-05-01 | End: 2020-07-02

## 2017-05-01 RX ORDER — METHYLPREDNISOLONE 4 MG/1
TABLET ORAL
Refills: 0 | COMMUNITY
Start: 2017-04-17 | End: 2017-05-01

## 2017-05-01 RX ORDER — NAPROXEN 500 MG/1
500 TABLET ORAL 2 TIMES DAILY
Qty: 60 TABLET | Refills: 0 | OUTPATIENT
Start: 2017-05-01 | End: 2020-07-02

## 2017-05-01 RX ORDER — KETOROLAC TROMETHAMINE 30 MG/ML
30 INJECTION, SOLUTION INTRAMUSCULAR; INTRAVENOUS
Status: COMPLETED | OUTPATIENT
Start: 2017-05-01 | End: 2017-05-01

## 2017-05-01 RX ADMIN — KETOROLAC TROMETHAMINE 30 MG: 60 INJECTION, SOLUTION INTRAMUSCULAR at 05:05

## 2017-05-01 NOTE — PROGRESS NOTES
Subjective:       Patient ID: Fani Calvo is a 41 y.o. female.    Chief Complaint: Leg Pain (left)    HPI   40 yo female, w/ no known pmhx, presents for L knee pain. Patient states her pain started about 3 weeks ago. She had multiple ED visit. XRs and USs have been negative. Patient states the pain is extremely severe and now has mid lumbar pain as well. Patient states she does not like to move her knee due to severe pain. She states the pain is located on anterior, lateral, and posterior part of her L knee. Patient endorses decrease ambulation and has a severe limp. Patient states she was tried on flexeril, motrin, and percocets but pain still persisted. Patient denies falling or twisting the knee.     Review of Systems   Constitutional: Negative for chills and fever.   Respiratory: Negative for shortness of breath and wheezing.    Cardiovascular: Negative for chest pain, palpitations and leg swelling.   Gastrointestinal: Negative for abdominal pain, constipation, diarrhea, nausea and vomiting.   Genitourinary: Negative for dysuria.   Musculoskeletal: Positive for back pain.        L knee pain       Objective:      Vitals:    05/01/17 1546   BP: (!) 132/90   Pulse: 90     Physical Exam   Constitutional: She is oriented to person, place, and time. No distress.   HENT:   Head: Normocephalic and atraumatic.   Eyes: Pupils are equal, round, and reactive to light.   Neck: Normal range of motion. Neck supple.   Cardiovascular: Normal rate, regular rhythm, normal heart sounds and intact distal pulses.  Exam reveals no gallop and no friction rub.    No murmur heard.  Pulmonary/Chest: Effort normal and breath sounds normal. She has no wheezes. She has no rales.   Abdominal: Soft. Bowel sounds are normal. She exhibits no distension. There is no tenderness.   Musculoskeletal:        Left knee: She exhibits decreased range of motion. Tenderness found.   Negative sitting straight leg test  L knee- severe TTP around  patient's bursa. Severe TTP to posterior knee. Pain with ROM.   Neurological: She is alert and oriented to person, place, and time.   Skin: Skin is warm and dry.   Psychiatric: She has a normal mood and affect. Her behavior is normal. Judgment and thought content normal.       Assessment:       1. Lateral knee pain, left        Plan:       Left Knee Bursitis   -  patient was given IM toradol due to her severe pain.  -     Ambulatory Referral to Physical/Occupational Therapy  -     naproxen (NAPROSYN) 500 MG tablet; Take 1 tablet (500 mg total) by mouth 2 (two) times daily.  Dispense: 60 tablet; Refill: 0  -     ketorolac injection 30 mg; Inject 1 mL (30 mg total) into the muscle one time.  -     oxycodone-acetaminophen (PERCOCET) 5-325 mg per tablet; Take 1 tablet by mouth every 8 (eight) hours as needed for Pain.  Dispense: 40 tablet; Refill: 0  - Will discuss with ortho due to patients severe pain and will call patient with the results.      Return in about 4 weeks (around 5/29/2017).

## 2017-05-26 ENCOUNTER — CLINICAL SUPPORT (OUTPATIENT)
Dept: REHABILITATION | Facility: HOSPITAL | Age: 42
End: 2017-05-26
Attending: FAMILY MEDICINE
Payer: MEDICAID

## 2017-05-26 DIAGNOSIS — M25.562 CHRONIC PAIN OF LEFT KNEE: ICD-10-CM

## 2017-05-26 DIAGNOSIS — G89.29 CHRONIC PAIN OF LEFT KNEE: ICD-10-CM

## 2017-05-26 PROCEDURE — 97163 PT EVAL HIGH COMPLEX 45 MIN: CPT | Mod: PO

## 2017-05-26 PROCEDURE — 97110 THERAPEUTIC EXERCISES: CPT | Mod: PO

## 2017-05-26 NOTE — PROGRESS NOTES
"OUTPATIENT PHYSICAL THERAPY  PHYSICAL THERAPY EVALUATION    Name: Fani Calvo  Clinic Number: [id number]    Evaluation Date: 05/26/2017  Visit #: 1 / 25  Authorization period Expiration: 12/31/2017  Plan of Care Expiration: 6/6/17  Precautions: standard    Diagnosis:   Encounter Diagnosis   Name Primary?    Chronic pain of left knee      Physician: Truong Gorman MD  Treatment Orders: PT Eval and Treat  Past Medical History:   Diagnosis Date    Chronic back pain      Current Outpatient Prescriptions   Medication Sig    cyclobenzaprine (FLEXERIL) 10 MG tablet Take 10 mg by mouth 3 (three) times daily as needed for Muscle spasms.    ibuprofen (ADVIL,MOTRIN) 600 MG tablet Take 1 tablet (600 mg total) by mouth every 6 (six) hours as needed for Pain.    naproxen (NAPROSYN) 500 MG tablet Take 1 tablet (500 mg total) by mouth 2 (two) times daily.    oxycodone-acetaminophen (PERCOCET) 5-325 mg per tablet Take 1 tablet by mouth every 8 (eight) hours as needed for Pain.    penicillin v potassium (VEETID) 500 MG tablet TK 1 T PO QID     No current facility-administered medications for this visit.      Review of patient's allergies indicates:   Allergen Reactions    Compazine [prochlorperazine] Other (See Comments)     "I had a stroke-like reaction"       History   Prior Therapy: none  Social History: has tub/shower but has pain standing or transferring into tub  Previous functional status: didn't really exercise. Worked 6-7 days a week and no limitations in lifting.  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 Denies previous L knee pain.   DOI: 6 weeks ago  Imaging, x-ray and US: " unremarkable  Pain: current 8/10, worst 10/10, best 5/10, Shooting, constant  Aggravating factors: prolonged standing, walking, tub transfer  Easing factors: oxycodone and Naproxen  Pts goals: The patients goal is to return to work without pain.    Objective   Mental status: oriented x3, anxious  Posture/ Alignment: Poor, weight shifted to R, guarded movement of LLE in WB and NWB, avoiding TKE    GAIT DEVIATIONS: Fani ARCINIEGA amb with decreased valerie and L circumduction with minimal knee ROM, increased stance time on R. Improved knee ROM and valerie after 10 minutes of ice.    FUNCTION:   - Sit <--> Stand: must use hands, weight shifted to R  - Bed Mobility: takes extra time, uses BUE to assist LLE  - Stairs: step-to gait with with 2 railings, ascends leading with RLE, descends leading with LLE. Performed slowly    ROM: unable to examine due to guarding and pain    Strength: unable to examine due to guarding and pain    Special Tests:  - Patellar Apprehension: L positive   - no other special tests were completed due to guarding and pain    Palpation:  extreme TTP medial and lateral patella, medial and lateral joint line, HS tendons, quad insertion    Joint Play:  Unable to examine due to guarding and pain    Pt/family was provided educational information, including: role of PT, goals for PT, scheduling - pt verbalized understanding. Discussed insurance plan with pt.     TREATMENT   Time In: 8:06 AM  Time Out: 8:55 AM    Discussed Plan of Care with patient: Yes    Fani ARCINIEGA received 10 minutes of therapeutic exercise including:   Elevated ankle pumps  Quad set 2 x 3 x 5 sec  Heel slides 2 x 4  (NEXT VISIT: AAROM recumbent bike, LAQ, SAQ, hook lying ball squeeze)      Fani received 10 min of ice with compression post-treatment.      Written Home Exercises Provided: yes  Exercises were reviewed and Fani ARCINIEGA was able to demonstrate them prior to the end of the session. Pt received a written copy of exercises to perform at  home. Fani ARCINIEGA demonstrated fair  understanding of the education provided.     Assessment   Fani ARCINIEGA is a 41 y.o. female referred to outpatient physical therapy with a medical diagnosis of L knee pain. Unable to determine cause of pain due to poor tolerance to today's exam and high levels of pain and guarding. Demonstrates impairments including limitations as described in the problem list. Pt prognosis is Fair due to poor exam tolerance. Pt may benefit from skilled outpatient physical therapy once a full PT exam can be performed to address the above stated deficits, provide pt/family education, and to maximize pt's level of independence.     History  Co-morbidities and personal factors that may impact the plan of care Examination  Body Structures and Functions, activity limitations and participation restrictions that may impact the plan of care    Clinical Presentation   Co-morbidities:   anxiety and chronic pain        Personal Factors:   high physical demands of work, inability to tolerate PT exam Body Regions:   lower extremities    Body Systems:   ROM  strength  balance  gait  transfers  joint play   swelling        Participation Restrictions:   Work, exercise, house cleaning     Activity limitations:   Learning and applying knowledge  no deficits    General Tasks and Commands  no deficits    Communication  no deficits    Mobility  lifting and carrying objects  walking    Self care  washing oneself (bathing, drying, washing hands)  caring for body parts (brushing teeth, shaving, grooming)  dressing    Domestic Life  cooking  doing house work (cleaning house, washing dishes, laundry)    Community and Social Life  recreation and leisure  work         evolving clinical presentation with unpredictable characteristics                      high   high  high Decision Making/ Complexity Score:  high     Pt's spiritual, cultural and educational needs considered and pt agreeable to plan of care and goals as stated below:      Anticipated Barriers for physical therapy: chronic pain, high SX irritability, inability to tolerate PT exam    Short Term GOALS:  In 4 weeks, pt. will:  - tolerate full PT exam to determine cause of SX and POC  - amb 40' on level tile and carpet without pain provocation with improved knee ROM and increased speed  - sit edge of bed with knee in dependent position (90 degrees of flexion) without guarding for 5 min  - stand with equal weight bearing with min pain with min cueing    Long Term GOALS:  To be determined once full exam is completed.    Plan   Outpatient physical therapy 1- 2 times weekly to include: pt ed, HEP, therapeutic exercises, therapeutic activities, neuromuscular re-education/ balance exercises, manual therapy, and modalities prn.   Cont PT 1-2 visits. If pt is unable to tolerate PT exam in the next 2 visits, pt is not appropriate to cont PT and should return to physician for further testing.  Pt may be seen by PTA as part of the rehabilitation team.     I certify the need for these services furnished under this plan of treatment and while under my care.    Cheryl Marvin, PT

## 2017-05-26 NOTE — PLAN OF CARE
"OUTPATIENT PHYSICAL THERAPY  PHYSICAL THERAPY EVALUATION    Name: Fani Calvo  Clinic Number: [id number]    Evaluation Date: 05/26/2017  Visit #: 1 / 25  Authorization period Expiration: 12/31/2017  Plan of Care Expiration: 6/6/17  Precautions: standard    Diagnosis:   Encounter Diagnosis   Name Primary?    Chronic pain of left knee      Physician: Truong Gorman MD  Treatment Orders: PT Eval and Treat  Past Medical History:   Diagnosis Date    Chronic back pain      Current Outpatient Prescriptions   Medication Sig    cyclobenzaprine (FLEXERIL) 10 MG tablet Take 10 mg by mouth 3 (three) times daily as needed for Muscle spasms.    ibuprofen (ADVIL,MOTRIN) 600 MG tablet Take 1 tablet (600 mg total) by mouth every 6 (six) hours as needed for Pain.    naproxen (NAPROSYN) 500 MG tablet Take 1 tablet (500 mg total) by mouth 2 (two) times daily.    oxycodone-acetaminophen (PERCOCET) 5-325 mg per tablet Take 1 tablet by mouth every 8 (eight) hours as needed for Pain.    penicillin v potassium (VEETID) 500 MG tablet TK 1 T PO QID     No current facility-administered medications for this visit.      Review of patient's allergies indicates:   Allergen Reactions    Compazine [prochlorperazine] Other (See Comments)     "I had a stroke-like reaction"       History   Prior Therapy: none  Social History: has tub/shower but has pain standing or transferring into tub  Previous functional status: didn't really exercise. Worked 6-7 days a week and no limitations in lifting.  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 Denies previous L knee pain.   DOI: 6 weeks ago  Imaging, x-ray and US: " unremarkable  Pain: current 8/10, worst 10/10, best 5/10, Shooting, constant  Aggravating factors: prolonged standing, walking, tub transfer  Easing factors: oxycodone and Naproxen  Pts goals: The patients goal is to return to work without pain.    Objective   Mental status: oriented x3, anxious  Posture/ Alignment: Poor, weight shifted to R, guarded movement of LLE in WB and NWB, avoiding TKE    GAIT DEVIATIONS: Fani ARCINIEGA amb with decreased valerie and L circumduction with minimal knee ROM, increased stance time on R. Improved knee ROM and valerie after 10 minutes of ice.    FUNCTION:   - Sit <--> Stand: must use hands, weight shifted to R  - Bed Mobility: takes extra time, uses BUE to assist LLE  - Stairs: step-to gait with with 2 railings, ascends leading with RLE, descends leading with LLE. Performed slowly    ROM: unable to examine due to guarding and pain    Strength: unable to examine due to guarding and pain    Special Tests:  - Patellar Apprehension: L positive   - no other special tests were completed due to guarding and pain    Palpation:  extreme TTP medial and lateral patella, medial and lateral joint line, HS tendons, quad insertion    Joint Play:  Unable to examine due to guarding and pain    Pt/family was provided educational information, including: role of PT, goals for PT, scheduling - pt verbalized understanding. Discussed insurance plan with pt.     TREATMENT   Time In: 8:06 AM  Time Out: 8:55 AM    Discussed Plan of Care with patient: Yes    Fani ARCINIEGA received 10 minutes of therapeutic exercise including:   Elevated ankle pumps  Quad set 2 x 3 x 5 sec  Heel slides 2 x 4  (NEXT VISIT: AAROM recumbent bike, LAQ, SAQ, hook lying ball squeeze)      Fani received 10 min of ice with compression post-treatment.      Written Home Exercises Provided: yes  Exercises were reviewed and Fani ARCINIEGA was able to demonstrate them prior to the end of the session. Pt received a written copy of exercises to perform at  home. Fani ARCINIEGA demonstrated fair  understanding of the education provided.     Assessment   Fani ARCINIEGA is a 41 y.o. female referred to outpatient physical therapy with a medical diagnosis of L knee pain. Unable to determine cause of pain due to poor tolerance to today's exam and high levels of pain and guarding. Demonstrates impairments including limitations as described in the problem list. Pt prognosis is Fair due to poor exam tolerance. Pt may benefit from skilled outpatient physical therapy once a full PT exam can be performed to address the above stated deficits, provide pt/family education, and to maximize pt's level of independence.     History  Co-morbidities and personal factors that may impact the plan of care Examination  Body Structures and Functions, activity limitations and participation restrictions that may impact the plan of care    Clinical Presentation   Co-morbidities:   anxiety and chronic pain        Personal Factors:   high physical demands of work, inability to tolerate PT exam Body Regions:   lower extremities    Body Systems:   ROM  strength  balance  gait  transfers  joint play   swelling        Participation Restrictions:   Work, exercise, house cleaning     Activity limitations:   Learning and applying knowledge  no deficits    General Tasks and Commands  no deficits    Communication  no deficits    Mobility  lifting and carrying objects  walking    Self care  washing oneself (bathing, drying, washing hands)  caring for body parts (brushing teeth, shaving, grooming)  dressing    Domestic Life  cooking  doing house work (cleaning house, washing dishes, laundry)    Community and Social Life  recreation and leisure  work         evolving clinical presentation with unpredictable characteristics                      high   high  high Decision Making/ Complexity Score:  high     Pt's spiritual, cultural and educational needs considered and pt agreeable to plan of care and goals as stated below:      Anticipated Barriers for physical therapy: chronic pain, high SX irritability, inability to tolerate PT exam    Short Term GOALS:  In 4 weeks, pt. will:  - tolerate full PT exam to determine cause of SX and POC  - amb 40' on level tile and carpet without pain provocation with improved knee ROM and increased speed  - sit edge of bed with knee in dependent position (90 degrees of flexion) without guarding for 5 min  - stand with equal weight bearing with min pain with min cueing    Long Term GOALS:  To be determined once full exam is completed.    Plan   Outpatient physical therapy 1- 2 times weekly to include: pt ed, HEP, therapeutic exercises, therapeutic activities, neuromuscular re-education/ balance exercises, manual therapy, and modalities prn.   Cont PT 1-2 visits. If pt is unable to tolerate PT exam in the next 2 visits, pt is not appropriate to cont PT and should return to physician for further testing.  Pt may be seen by PTA as part of the rehabilitation team.     I certify the need for these services furnished under this plan of treatment and while under my care.    Cheryl Marvin, PT

## 2017-05-26 NOTE — PATIENT INSTRUCTIONS
Strengthening: Quadriceps Set    Tighten muscles on top of thighs by pushing knees down into surface. Hold 5 seconds.  Repeat 4 times per set each side. Do 2 sets per session. Do 2 sessions per day.        Heel Slides    With towel around left heel, gently pull knee up with towel until stretch is felt. Hold 5 seconds.  Repeat 4 times per set left leg. Do 2 sets per session. Do 1 sessions per day.        Ankle Pump    With left leg elevated, gently flex and extend ankle. Move through full range of motion. Avoid pain. Perform more frequently if having increased swelling.  Repeat 30 times per set left leg. Do 1 sets per session. Do 5 sessions per day.      Please contact your physical therapist, Suzanne Marvin, at [phone] or [email or web address] with any questions.

## 2017-06-02 ENCOUNTER — TELEPHONE (OUTPATIENT)
Dept: REHABILITATION | Facility: HOSPITAL | Age: 42
End: 2017-06-02

## 2017-06-02 NOTE — TELEPHONE ENCOUNTER
Called pt RE: no show appt and left message to confirm next appt. Requested a call back to confirm next appt.    Cheryl Marvin, PT

## 2017-06-29 ENCOUNTER — DOCUMENTATION ONLY (OUTPATIENT)
Dept: REHABILITATION | Facility: HOSPITAL | Age: 42
End: 2017-06-29

## 2017-06-29 PROBLEM — G89.29 CHRONIC PAIN OF LEFT KNEE: Status: RESOLVED | Noted: 2017-05-26 | Resolved: 2017-06-29

## 2017-06-29 PROBLEM — M25.562 CHRONIC PAIN OF LEFT KNEE: Status: RESOLVED | Noted: 2017-05-26 | Resolved: 2017-06-29

## 2017-06-29 NOTE — PROGRESS NOTES
Patient was seen for 1 outpatient PT visits on 5/26/2017. Pt missed/no showed 1 scheduled sessions. Treatment included: evaluation, HEP, pt education, manual therapy, ther ex, and ice.   Pt. did not return for follow up sessions and did not reschedule follow up visits. PT unable to fully assess goal achievement, and patient is discharged from outpatient PT Services.

## 2017-10-25 ENCOUNTER — HOSPITAL ENCOUNTER (EMERGENCY)
Facility: OTHER | Age: 42
Discharge: HOME | End: 2017-10-25
Attending: EMERGENCY MEDICINE
Payer: MEDICAID

## 2017-10-25 VITALS
HEART RATE: 89 BPM | OXYGEN SATURATION: 96 % | BODY MASS INDEX: 29.99 KG/M2 | WEIGHT: 180 LBS | HEIGHT: 65 IN | DIASTOLIC BLOOD PRESSURE: 61 MMHG | TEMPERATURE: 99 F | RESPIRATION RATE: 18 BRPM | SYSTOLIC BLOOD PRESSURE: 122 MMHG

## 2017-10-25 DIAGNOSIS — M54.42 ACUTE MIDLINE LOW BACK PAIN WITH LEFT-SIDED SCIATICA: Primary | ICD-10-CM

## 2017-10-25 DIAGNOSIS — M79.605 LEFT LEG PAIN: ICD-10-CM

## 2017-10-25 LAB
B-HCG UR QL: NEGATIVE
CTP QC/QA: YES

## 2017-10-25 PROCEDURE — 63600175 PHARM REV CODE 636 W HCPCS: Performed by: PHYSICIAN ASSISTANT

## 2017-10-25 PROCEDURE — 96372 THER/PROPH/DIAG INJ SC/IM: CPT

## 2017-10-25 PROCEDURE — 81025 URINE PREGNANCY TEST: CPT | Performed by: EMERGENCY MEDICINE

## 2017-10-25 PROCEDURE — 99284 EMERGENCY DEPT VISIT MOD MDM: CPT | Mod: 25

## 2017-10-25 RX ORDER — PREDNISONE 20 MG/1
60 TABLET ORAL DAILY
Qty: 15 TABLET | Refills: 0 | Status: SHIPPED | OUTPATIENT
Start: 2017-10-25 | End: 2017-10-30

## 2017-10-25 RX ORDER — IBUPROFEN 600 MG/1
600 TABLET ORAL EVERY 6 HOURS PRN
Qty: 20 TABLET | Refills: 0 | OUTPATIENT
Start: 2017-10-25 | End: 2020-07-02

## 2017-10-25 RX ORDER — HYDROMORPHONE HYDROCHLORIDE 2 MG/ML
2 INJECTION, SOLUTION INTRAMUSCULAR; INTRAVENOUS; SUBCUTANEOUS
Status: COMPLETED | OUTPATIENT
Start: 2017-10-25 | End: 2017-10-25

## 2017-10-25 RX ORDER — KETOROLAC TROMETHAMINE 30 MG/ML
30 INJECTION, SOLUTION INTRAMUSCULAR; INTRAVENOUS
Status: COMPLETED | OUTPATIENT
Start: 2017-10-25 | End: 2017-10-25

## 2017-10-25 RX ORDER — CYCLOBENZAPRINE HCL 10 MG
10 TABLET ORAL 3 TIMES DAILY PRN
Qty: 15 TABLET | Refills: 0 | OUTPATIENT
Start: 2017-10-25 | End: 2020-07-02

## 2017-10-25 RX ADMIN — HYDROMORPHONE HYDROCHLORIDE 1 MG: 2 INJECTION INTRAMUSCULAR; INTRAVENOUS; SUBCUTANEOUS at 01:10

## 2017-10-25 RX ADMIN — KETOROLAC TROMETHAMINE 30 MG: 30 INJECTION, SOLUTION INTRAMUSCULAR at 01:10

## 2017-10-25 NOTE — ED PROVIDER NOTES
"Encounter Date: 10/25/2017       History     Chief Complaint   Patient presents with    Tailbone Pain     starting yesterday, pain shooting down left leg     Patient is a 42-year-old female with chronic back pain and sciatica who presents to the ED with back and leg pain.  She reports 4 day history of left lower back pain that radiates to her left leg.  She states pain is similar to her acute flares of sciatica pain.  She reports taking ibuprofen and Motrin without relief.  She denies numbness or tingling.  She denies fever, saddle anesthesia, or bladder/bowel incontinence.  She states she is managed for her chronic back pain by her PCP.  She has not been evaluated by the spine clinic or pain management.      The history is provided by the patient.     Review of patient's allergies indicates:   Allergen Reactions    Compazine [prochlorperazine] Other (See Comments)     "I had a stroke-like reaction"     Past Medical History:   Diagnosis Date    Chronic back pain      No past surgical history on file.  No family history on file.  Social History   Substance Use Topics    Smoking status: Current Every Day Smoker     Packs/day: 0.50     Types: Cigarettes    Smokeless tobacco: Never Used    Alcohol use No     Review of Systems   Constitutional: Negative for chills and fever.   HENT: Negative for congestion and sore throat.    Eyes: Negative for pain.   Respiratory: Negative for shortness of breath.    Cardiovascular: Negative for chest pain.   Gastrointestinal: Negative for abdominal pain, diarrhea, nausea and vomiting.   Genitourinary: Negative for dysuria.   Musculoskeletal: Negative for arthralgias.   Skin: Negative for rash.   Neurological: Negative for headaches.       Physical Exam     Initial Vitals   BP Pulse Resp Temp SpO2   10/25/17 1304 10/25/17 1304 10/25/17 1304 10/25/17 1304 10/25/17 1343   (!) 175/78 108 18 99.2 °F (37.3 °C) 100 %      MAP       10/25/17 1304       110.33         Vitals:    10/25/17 " "1304 10/25/17 1343 10/25/17 1345 10/25/17 1346   BP: (!) 175/78 (!) 140/89 (!) 140/89    Pulse: 108 108 100 110   Resp: 18 18     Temp: 99.2 °F (37.3 °C)      TempSrc: Oral      SpO2:  100% 100% 100%   Weight: 81.6 kg (180 lb)      Height: 5' 5" (1.651 m)       10/25/17 1413   BP: 122/61   Pulse: 89   Resp: 18   Temp:    TempSrc:    SpO2: 96%   Weight:    Height:        Physical Exam    Constitutional: Vital signs are normal. She appears well-developed. She is cooperative.   -American female.  She is tearful on exam.  She is in a wheelchair secondary to pain.   HENT:   Head: Normocephalic and atraumatic.   Mouth/Throat: Oropharynx is clear and moist.   Eyes: Conjunctivae and EOM are normal. Pupils are equal, round, and reactive to light.   Neck: Normal range of motion. Neck supple.   Cardiovascular: Normal rate, regular rhythm and intact distal pulses.   Pulmonary/Chest: Breath sounds normal. She has no wheezes. She has no rhonchi. She has no rales.   Abdominal: Soft. Bowel sounds are normal. There is no tenderness. There is no rebound and no guarding.   Musculoskeletal:   Tenderness to palpation over the left mid buttock area and sacrum.  No cervical thoracic or lumbar spinal midline tenderness, masses, crepitus or step-offs.  Left lower extremity: DP pulses are 1+ and equal.   No patellar laxity, or effusion. Negative valgus/varus stress, negative anterior and posterior drawer. Negative Pauline's test, medial and lateral stress. Diffuse left knee TTP. No overlying erythema or obvious swelling.   Neurological: No sensory deficit. GCS eye subscore is 4. GCS verbal subscore is 5. GCS motor subscore is 6.   AAOx4. CN II-XII were intact. Good finger-to-nose task ability. Sarah intact. Strength 5/5 in all extremities.    Skin: Skin is warm and dry. Capillary refill takes less than 2 seconds. No rash noted.   Psychiatric: She has a normal mood and affect. Her behavior is normal.         ED Course "   Procedures  Labs Reviewed   POCT URINE PREGNANCY             Medical Decision Making:   Initial Assessment:   Urgent evaluation of a 42 y.o. female with a medical history of back pin presenting to the emergency department complaining of back pain. Patient is afebrile, nontoxic appearing and hemodynamically stable.  ED Management:  Physical exam and hisotry consistent with sciatica. On exam there is tenderness to the popliteal bone. No obvious deformity.  Patient has had multiple visits for back pain.  She has had previous imaging that revealed no DVT or fracture. Negative Well's criteria. She was treated with analgesics and antiinflammatory. There was no signs or symptom of cord compression or cauda equina syndrome. She was advised to return for any new or worsening symptoms.  I have provided her with a referral to a spine clinic in pain management clinic here.  She is amenable to this plan.  I discussed this patient with my attending physician who agrees to my assessment and plan.              Attending Attestation:     Physician Attestation Statement for NP/PA:   I have conducted a face to face encounter with this patient in addition to the NP/PA, due to NP/PA Request    Other NP/PA Attestation Additions:    History of Present Illness: Emergent evaluation a 42-year-old female with complaint of acute exacerbation of chronic sciatica pain.  Patient has multiple ER visits for same.  There is no incontinence or neuro symptoms.   Physical Exam: DP pulses are 1+ and equal.  Normal gait.   Medical Decision Making: I do not suspect cauda equina syndrome, cord compression, DVT.  Patient had full workup and has no red flag symptoms for neurosurgical emergency.  I do not suspect aortic dissection.  Patient became angry when she was not discharged with prescription for narcotics.  She is advised close follow-up with spine clinic in pain management, advised that she cannot get narcotic prescriptions for this chronic problem  from the emergency room.                 ED Course      Clinical Impression:     1. Acute midline low back pain with left-sided sciatica    2. Left leg pain                             Dada Pradhan PA-C  10/25/17 0093       Joseline Rosa MD  10/25/17 [id number]

## 2017-10-25 NOTE — ED NOTES
"Pt reports left tailbone pain radiating down left leg. Burning pain to foot. "It feels like water running down the back of my leg." Pain 9/10. Pt in w/c. Crying and moaning. NAD. Had pain similar to this a couple of months ago. Will cont to monitor.  "

## 2019-02-25 VITALS
TEMPERATURE: 98 F | HEART RATE: 88 BPM | OXYGEN SATURATION: 98 % | WEIGHT: 170 LBS | SYSTOLIC BLOOD PRESSURE: 146 MMHG | BODY MASS INDEX: 28.32 KG/M2 | HEIGHT: 65 IN | DIASTOLIC BLOOD PRESSURE: 80 MMHG | RESPIRATION RATE: 20 BRPM

## 2019-02-25 LAB
B-HCG UR QL: NEGATIVE
CTP QC/QA: YES
INFLUENZA A, MOLECULAR: NEGATIVE
INFLUENZA B, MOLECULAR: NEGATIVE
SPECIMEN SOURCE: NORMAL

## 2019-02-25 PROCEDURE — 87502 INFLUENZA DNA AMP PROBE: CPT

## 2019-02-25 PROCEDURE — 81025 URINE PREGNANCY TEST: CPT | Performed by: EMERGENCY MEDICINE

## 2019-02-25 PROCEDURE — 99284 EMERGENCY DEPT VISIT MOD MDM: CPT | Mod: 25

## 2019-02-26 ENCOUNTER — HOSPITAL ENCOUNTER (EMERGENCY)
Facility: OTHER | Age: 44
Discharge: HOME | End: 2019-02-26
Attending: EMERGENCY MEDICINE
Payer: MEDICAID

## 2019-02-26 DIAGNOSIS — R05.9 COUGH: ICD-10-CM

## 2019-02-26 DIAGNOSIS — J40 BRONCHITIS: Primary | ICD-10-CM

## 2019-02-26 DIAGNOSIS — R68.89 FLU-LIKE SYMPTOMS: ICD-10-CM

## 2019-02-26 PROCEDURE — 25000003 PHARM REV CODE 250: Performed by: PHYSICIAN ASSISTANT

## 2019-02-26 RX ORDER — ALBUTEROL SULFATE 90 UG/1
1-2 AEROSOL, METERED RESPIRATORY (INHALATION) EVERY 6 HOURS PRN
Qty: 1 INHALER | Refills: 0 | Status: SHIPPED | OUTPATIENT
Start: 2019-02-26 | End: 2020-02-26

## 2019-02-26 RX ORDER — FLUTICASONE PROPIONATE 50 MCG
1 SPRAY, SUSPENSION (ML) NASAL 2 TIMES DAILY
Qty: 15 G | Refills: 0 | OUTPATIENT
Start: 2019-02-26 | End: 2020-07-02

## 2019-02-26 RX ORDER — PROMETHAZINE HYDROCHLORIDE 6.25 MG/5ML
12.5 SYRUP ORAL EVERY 6 HOURS PRN
Qty: 118 ML | Refills: 0 | OUTPATIENT
Start: 2019-02-26 | End: 2020-07-02

## 2019-02-26 RX ORDER — BENZONATATE 100 MG/1
100 CAPSULE ORAL 3 TIMES DAILY PRN
Qty: 12 CAPSULE | Refills: 0 | Status: SHIPPED | OUTPATIENT
Start: 2019-02-26 | End: 2019-03-08

## 2019-02-26 RX ORDER — AZITHROMYCIN 250 MG/1
TABLET, FILM COATED ORAL
Qty: 6 TABLET | Refills: 0 | Status: SHIPPED | OUTPATIENT
Start: 2019-02-26 | End: 2019-03-03

## 2019-02-26 RX ORDER — BENZONATATE 100 MG/1
100 CAPSULE ORAL ONCE
Status: COMPLETED | OUTPATIENT
Start: 2019-02-26 | End: 2019-02-26

## 2019-02-26 RX ADMIN — BENZONATATE 100 MG: 100 CAPSULE ORAL at 12:02

## 2019-02-26 NOTE — ED PROVIDER NOTES
"Encounter Date: 2/25/2019       History     Chief Complaint   Patient presents with    Cough     c/o dry cough x3 days pta with body aches     Patient is a 43 year old female with chronic back pain who presents to the ED with cough and flu-like symptoms. Patient reports a 3 day history of a nonproductive cough.  She reports 1 episode of posttussive emesis yesterday.  She reports the cough is worse at night.  She reports subjective fever and chills. She also reports fatigue and body aches.  She states her body aches are located to her upper back that occurred after coughing.  She reports taking multiple over-the-counter medicines without relief.  She is current everyday cigarette smoker.  She states this feels similar to when she had episode of bronchitis.  She denies chest pain.  She states she had one episode when she got "short-winded" after coughing.      The history is provided by the patient.     Review of patient's allergies indicates:   Allergen Reactions    Compazine [prochlorperazine] Other (See Comments)     "I had a stroke-like reaction"     Past Medical History:   Diagnosis Date    Chronic back pain      No past surgical history on file.  No family history on file.  Social History     Tobacco Use    Smoking status: Current Every Day Smoker     Packs/day: 0.50     Types: Cigarettes    Smokeless tobacco: Never Used   Substance Use Topics    Alcohol use: No    Drug use: No     Review of Systems   Constitutional: Positive for chills and fatigue. Negative for fever.   HENT: Negative for congestion and sore throat.    Eyes: Negative for pain.   Respiratory: Positive for cough. Negative for shortness of breath and wheezing.    Cardiovascular: Negative for chest pain.   Gastrointestinal: Negative for abdominal pain, diarrhea, nausea and vomiting.   Genitourinary: Negative for dysuria.   Musculoskeletal: Positive for myalgias. Negative for arthralgias.   Skin: Negative for rash.   Neurological: Negative " for headaches.       Physical Exam     Initial Vitals [02/25/19 2258]   BP Pulse Resp Temp SpO2   (!) 146/80 88 20 98.4 °F (36.9 °C) 98 %      MAP       --         Physical Exam    Vitals reviewed.  Constitutional: She is cooperative.   Patient appears sick but nontoxic.  She speaks in clear and full sentences.   HENT:   Head: Normocephalic and atraumatic.   Cobblestoning posterior oropharynx   Eyes: EOM are normal. Pupils are equal, round, and reactive to light.   Neck: Normal range of motion. Neck supple.   Cardiovascular: Normal rate, regular rhythm and intact distal pulses.   Pulmonary/Chest: Breath sounds normal. She has no wheezes. She has no rhonchi. She has no rales.   Dry, hacking cough noted on exam   Abdominal: Soft. Bowel sounds are normal. There is no tenderness.   Musculoskeletal: Normal range of motion. She exhibits no edema.   Neurological: She is alert and oriented to person, place, and time. GCS eye subscore is 4. GCS verbal subscore is 5. GCS motor subscore is 6.   Skin: Skin is warm and dry. No rash noted.         ED Course   Procedures  Labs Reviewed   INFLUENZA A & B BY MOLECULAR   POCT URINE PREGNANCY          Imaging Results          X-Ray Chest PA And Lateral (Final result)  Result time 02/25/19 23:44:20    Final result by Arturo Riddle MD (02/25/19 23:44:20)                 Impression:      No acute process.      Electronically signed by: Arturo Riddle MD  Date:    02/25/2019  Time:    23:44             Narrative:    EXAMINATION:  XR CHEST PA AND LATERAL    CLINICAL HISTORY:  Cough    TECHNIQUE:  PA and lateral views of the chest were performed.    COMPARISON:  None    FINDINGS:  The trachea is unremarkable.  The cardiomediastinal silhouette is within normal limits.  The hilar structures are unremarkable.  The hemidiaphragms are unremarkable.  There is no evidence of free air beneath the hemidiaphragms.  There are no pleural effusions.  There is no evidence of a pneumothorax.  There is no  evidence of pneumomediastinum.  No airspace opacities are present.  The osseous structures are unremarkable.  The subcutaneous tissues are within normal limits.                                 Medical Decision Making:   Initial Assessment:   Urgent evaluation of a 43 y.o. female presenting to the emergency department complaining of cough and flu-like symptoms. Patient is afebrile, nontoxic appearing and hemodynamically stable.  Patient does have note of dry cough on exam.  She appears sick but nontoxic.  Lungs are clear auscultation bilaterally.  She is not hypoxic or tachypneic.  Rapid flu swab obtained from triage is negative.  Chest x-ray obtained from triage reveals no acute cardiopulmonary process.  Given patient's history of reported fever and chills and smoking history, will treat with antibiotics for presumed bronchitis.  Patient also be sent home with antitussive medication, albuterol inhaler, and Flonase.  She is advised follow up with her primary care provider return here for new worsening symptoms.                      Clinical Impression:     1. Bronchitis    2. Cough    3. Flu-like symptoms                               Dada Pradhan PA-C  02/26/19 0048

## 2019-09-05 ENCOUNTER — HOSPITAL ENCOUNTER (EMERGENCY)
Facility: OTHER | Age: 44
Discharge: HOME | End: 2019-09-05
Attending: EMERGENCY MEDICINE
Payer: MEDICAID

## 2019-09-05 VITALS
TEMPERATURE: 99 F | DIASTOLIC BLOOD PRESSURE: 76 MMHG | HEART RATE: 85 BPM | BODY MASS INDEX: 30.32 KG/M2 | RESPIRATION RATE: 17 BRPM | WEIGHT: 182 LBS | OXYGEN SATURATION: 99 % | SYSTOLIC BLOOD PRESSURE: 136 MMHG | HEIGHT: 65 IN

## 2019-09-05 DIAGNOSIS — N20.0 NEPHROLITHIASIS: Primary | ICD-10-CM

## 2019-09-05 LAB
ALBUMIN SERPL BCP-MCNC: 4.1 G/DL (ref 3.5–5.2)
ALP SERPL-CCNC: 58 U/L (ref 55–135)
ALT SERPL W/O P-5'-P-CCNC: 16 U/L (ref 10–44)
ANION GAP SERPL CALC-SCNC: 12 MMOL/L (ref 8–16)
AST SERPL-CCNC: 25 U/L (ref 10–40)
B-HCG UR QL: NEGATIVE
BACTERIA #/AREA URNS HPF: NORMAL /HPF
BASOPHILS # BLD AUTO: 0.06 K/UL (ref 0–0.2)
BASOPHILS NFR BLD: 0.7 % (ref 0–1.9)
BILIRUB SERPL-MCNC: 0.2 MG/DL (ref 0.1–1)
BILIRUB UR QL STRIP: NEGATIVE
BUN SERPL-MCNC: 9 MG/DL (ref 6–20)
CALCIUM SERPL-MCNC: 9.3 MG/DL (ref 8.7–10.5)
CHLORIDE SERPL-SCNC: 107 MMOL/L (ref 95–110)
CLARITY UR: CLEAR
CO2 SERPL-SCNC: 19 MMOL/L (ref 23–29)
COLOR UR: YELLOW
CREAT SERPL-MCNC: 1 MG/DL (ref 0.5–1.4)
CTP QC/QA: YES
DIFFERENTIAL METHOD: ABNORMAL
EOSINOPHIL # BLD AUTO: 0.2 K/UL (ref 0–0.5)
EOSINOPHIL NFR BLD: 2 % (ref 0–8)
ERYTHROCYTE [DISTWIDTH] IN BLOOD BY AUTOMATED COUNT: 13.6 % (ref 11.5–14.5)
EST. GFR  (AFRICAN AMERICAN): >60 ML/MIN/1.73 M^2
EST. GFR  (NON AFRICAN AMERICAN): >60 ML/MIN/1.73 M^2
GLUCOSE SERPL-MCNC: 82 MG/DL (ref 70–110)
GLUCOSE UR QL STRIP: NEGATIVE
HCT VFR BLD AUTO: 40.2 % (ref 37–48.5)
HGB BLD-MCNC: 13.5 G/DL (ref 12–16)
HGB UR QL STRIP: ABNORMAL
IMM GRANULOCYTES # BLD AUTO: 0.01 K/UL (ref 0–0.04)
IMM GRANULOCYTES NFR BLD AUTO: 0.1 % (ref 0–0.5)
KETONES UR QL STRIP: NEGATIVE
LEUKOCYTE ESTERASE UR QL STRIP: ABNORMAL
LYMPHOCYTES # BLD AUTO: 3.7 K/UL (ref 1–4.8)
LYMPHOCYTES NFR BLD: 41.7 % (ref 18–48)
MCH RBC QN AUTO: 32.5 PG (ref 27–31)
MCHC RBC AUTO-ENTMCNC: 33.6 G/DL (ref 32–36)
MCV RBC AUTO: 97 FL (ref 82–98)
MICROSCOPIC COMMENT: NORMAL
MONOCYTES # BLD AUTO: 0.7 K/UL (ref 0.3–1)
MONOCYTES NFR BLD: 7.5 % (ref 4–15)
NEUTROPHILS # BLD AUTO: 4.3 K/UL (ref 1.8–7.7)
NEUTROPHILS NFR BLD: 48 % (ref 38–73)
NITRITE UR QL STRIP: NEGATIVE
NRBC BLD-RTO: 0 /100 WBC
PH UR STRIP: 6 [PH] (ref 5–8)
PLATELET # BLD AUTO: 308 K/UL (ref 150–350)
PMV BLD AUTO: 9.3 FL (ref 9.2–12.9)
POTASSIUM SERPL-SCNC: 4.5 MMOL/L (ref 3.5–5.1)
PROT SERPL-MCNC: 8 G/DL (ref 6–8.4)
PROT UR QL STRIP: NEGATIVE
RBC # BLD AUTO: 4.15 M/UL (ref 4–5.4)
RBC #/AREA URNS HPF: 2 /HPF (ref 0–4)
SODIUM SERPL-SCNC: 138 MMOL/L (ref 136–145)
SP GR UR STRIP: 1.01 (ref 1–1.03)
SQUAMOUS #/AREA URNS HPF: 3 /HPF
URN SPEC COLLECT METH UR: ABNORMAL
UROBILINOGEN UR STRIP-ACNC: NEGATIVE EU/DL
WBC # BLD AUTO: 8.88 K/UL (ref 3.9–12.7)
WBC #/AREA URNS HPF: 3 /HPF (ref 0–5)

## 2019-09-05 PROCEDURE — 96361 HYDRATE IV INFUSION ADD-ON: CPT

## 2019-09-05 PROCEDURE — 85025 COMPLETE CBC W/AUTO DIFF WBC: CPT

## 2019-09-05 PROCEDURE — 81025 URINE PREGNANCY TEST: CPT | Performed by: EMERGENCY MEDICINE

## 2019-09-05 PROCEDURE — 96374 THER/PROPH/DIAG INJ IV PUSH: CPT

## 2019-09-05 PROCEDURE — 63600175 PHARM REV CODE 636 W HCPCS: Performed by: EMERGENCY MEDICINE

## 2019-09-05 PROCEDURE — 81000 URINALYSIS NONAUTO W/SCOPE: CPT

## 2019-09-05 PROCEDURE — 99285 EMERGENCY DEPT VISIT HI MDM: CPT | Mod: 25

## 2019-09-05 PROCEDURE — 80053 COMPREHEN METABOLIC PANEL: CPT

## 2019-09-05 RX ORDER — ONDANSETRON 4 MG/1
4 TABLET, ORALLY DISINTEGRATING ORAL EVERY 8 HOURS PRN
Qty: 12 TABLET | Refills: 0 | OUTPATIENT
Start: 2019-09-05 | End: 2020-07-02

## 2019-09-05 RX ORDER — KETOROLAC TROMETHAMINE 30 MG/ML
30 INJECTION, SOLUTION INTRAMUSCULAR; INTRAVENOUS
Status: COMPLETED | OUTPATIENT
Start: 2019-09-05 | End: 2019-09-05

## 2019-09-05 RX ORDER — TRAMADOL HYDROCHLORIDE 50 MG/1
50 TABLET ORAL EVERY 6 HOURS PRN
Qty: 12 TABLET | Refills: 0 | OUTPATIENT
Start: 2019-09-05 | End: 2020-07-02

## 2019-09-05 RX ADMIN — SODIUM CHLORIDE 1000 ML: 0.9 INJECTION, SOLUTION INTRAVENOUS at 08:09

## 2019-09-05 RX ADMIN — KETOROLAC TROMETHAMINE 30 MG: 30 INJECTION, SOLUTION INTRAMUSCULAR; INTRAVENOUS at 08:09

## 2019-09-06 NOTE — DISCHARGE INSTRUCTIONS
We have prescribed you pain medication. Please fill and take as directed. Do not drink alcohol or drive while taking this medication.  Do not take any additional tylenol while taking this medication.  It is important to take stool softeners and/or eat plenty of fiber to prevent constipation while taking this medication.      We have also prescribed you nausea medication.    Please return to the ER if you have chest pain, difficulty breathing, fevers, altered mental status, dizziness, weakness, or any other concerns.      Follow up with your primary care physician.

## 2019-09-06 NOTE — ED PROVIDER NOTES
"Encounter Date: 9/5/2019       History     Chief Complaint   Patient presents with    Abdominal Pain     x 2 hrs, suprapubic pain, no N/V/D, "dull sharp pain"     Seen by provider at 7:58PM:      Patient is a 44-year-old female who presents to the emergency department with acute onset of periumbilical and suprapubic pain radiating to her left lower back.  Patient denies any urinary symptoms, but did note that a few days ago when urinating she noted a dull left flank pain. She denies any hematuria or dysuria.  She denies any nausea/vomiting/diarrhea.  She denies any chest pain/shortness breath.  Denies any history of abdominal surgeries.  She denies any fevers/chills.  She felt her normal self this morning.  She denies any history of kidney stones.        Review of patient's allergies indicates:   Allergen Reactions    Compazine [prochlorperazine] Other (See Comments)     "I had a stroke-like reaction"     Past Medical History:   Diagnosis Date    Chronic back pain      History reviewed. No pertinent surgical history.  History reviewed. No pertinent family history.  Social History     Tobacco Use    Smoking status: Current Every Day Smoker     Packs/day: 0.50     Types: Cigarettes    Smokeless tobacco: Never Used   Substance Use Topics    Alcohol use: No    Drug use: No     Review of Systems   Constitutional: Negative for chills and fever.   HENT: Negative for congestion and rhinorrhea.    Cardiovascular: Negative for chest pain and palpitations.   Gastrointestinal: Positive for abdominal pain. Negative for diarrhea, nausea and vomiting.   Genitourinary: Positive for flank pain. Negative for frequency and hematuria.   Musculoskeletal: Negative for back pain and neck pain.   Skin: Negative for color change and wound.   Neurological: Negative for dizziness and headaches.       Physical Exam     Initial Vitals [09/05/19 1944]   BP Pulse Resp Temp SpO2   (!) 156/96 94 16 98.8 °F (37.1 °C) 99 %      MAP       --    "      Physical Exam    Nursing note and vitals reviewed.  Constitutional: She appears well-developed and well-nourished.   HENT:   Head: Normocephalic and atraumatic.   Eyes: Conjunctivae are normal.   Neck: Normal range of motion. Neck supple.   Cardiovascular: Normal rate, regular rhythm and normal heart sounds.   Pulmonary/Chest: Breath sounds normal. No respiratory distress. She has no wheezes. She has no rales.   Abdominal: Soft. Bowel sounds are normal. She exhibits no distension. There is tenderness.   Mild suprapubic tenderness noted.   Musculoskeletal:   Negative CVA tenderness bilaterally.   Neurological: She is alert and oriented to person, place, and time.   Ambulatory with steady gait.   Skin: Skin is warm and dry.         ED Course   Procedures  Labs Reviewed   URINALYSIS, REFLEX TO URINE CULTURE - Abnormal; Notable for the following components:       Result Value    Occult Blood UA Trace (*)     Leukocytes, UA 1+ (*)     All other components within normal limits    Narrative:     Preferred Collection Type->Urine, Clean Catch   CBC W/ AUTO DIFFERENTIAL - Abnormal; Notable for the following components:    Mean Corpuscular Hemoglobin 32.5 (*)     All other components within normal limits   URINALYSIS MICROSCOPIC    Narrative:     Preferred Collection Type->Urine, Clean Catch   COMPREHENSIVE METABOLIC PANEL   POCT URINE PREGNANCY          Imaging Results          CT Renal Stone Study ABD Pelvis WO (Final result)  Result time 09/05/19 21:11:09    Final result by Sanjay Roy MD (09/05/19 21:11:09)                 Impression:      1. The distal ureters are unable to be followed in their entirety to the urinary bladder.  There are scattered phleboliths along the potential course of the distal ureters however no secondary findings to suggest obstructive uropathy.  Correlation with urinalysis is recommended as distal ureteral calculus felt less likely though not entirely excluded, particularly on the  left.  2. Moderate to large amount of stool throughout the colon.  3. Additional findings above.      Electronically signed by: Sanjay Roy MD  Date:    09/05/2019  Time:    21:11             Narrative:    EXAMINATION:  CT RENAL STONE STUDY ABD PELVIS WO    CLINICAL HISTORY:  Flank pain, stone disease suspected;    TECHNIQUE:  Low dose axial images, sagittal and coronal reformations were obtained from the lung bases to the pubic symphysis.  Contrast was not administered.    COMPARISON:  None    FINDINGS:  Images of the lower thorax are remarkable for minimal dependent atelectasis.    The liver, spleen, pancreas, gallbladder and adrenal glands have a grossly unremarkable noncontrast appearance.  There is no biliary dilation or ascites.  No significant abdominal lymphadenopathy.    The kidneys have a grossly unremarkable noncontrast appearance without hydronephrosis or nephrolithiasis.  There is questionable indistinctness about the left renal hilum versus motion artifact.  The bilateral ureters are unremarkable without convincing calculi seen noting there are a few scattered phleboliths in the pelvis, along the potential course of the distal ureters.  No secondary findings to suggest obstructive uropathy.  The urinary bladder is grossly unremarkable.  The uterus and adnexa is grossly unremarkable.    There is moderate stool in the colon.  The terminal ileum and appendix are unremarkable.  The small bowel is grossly unremarkable.  No focal organized pelvic fluid collection.    Degenerative changes are noted of the pubic symphysis and spine.  There is a somewhat sclerotic appearance to the osseous structures, clinical correlation recommended with any history of calcium metabolism disorder/renal insufficiency.  No significant inguinal lymphadenopathy.                                 Medical Decision Making:   History:   Old Medical Records: I decided to obtain old medical records.  Old Records Summarized: other  records.  Initial Assessment:   7:58PM:  Patient is a 44-year-old female who presents to the emergency department with suprapubic and periumbilical abdominal pain.  Patient appears well, nontoxic.  She does have some mild tenderness to palpation. Given the acuity of pain along with radiation to the back, will plan to get a CT to rule out kidney stone.  Will plan for labs, analgesia, IV fluids, will continue to follow reassess.  Clinical Tests:   Lab Tests: Ordered and Reviewed  Radiological Study: Ordered and Reviewed    9:29 PM:  Patient doing well, feeling much better.  Her labs are unremarkable with the exception of trace blood in her UA.  Her CT is suggestive of a kidney stone, though they are not able to definitively identify them.  However given her HPI and her presentation, we plan to treat her as such.  I updated the patient regarding results and I counseled the patient regarding supportive care measures.  I have discussed with the pt ED return warnings and need for close PCP f/u.  Pt agreeable to plan and all questions answered.  I feel that pt is stable for discharge and management as an outpatient and no further intervention is needed at this time.  Pt is comfortable returning to the ED if needed.  Will DC home in stable condition.                          Clinical Impression:     1. Nephrolithiasis                                 Keshia Stephen MD  09/05/19 [id number]

## 2019-09-06 NOTE — ED TRIAGE NOTES
"Pt arrived with c/o abd pain, pt states pain is where umbilicus is and in suprapubic area.  Abd pain dull in nature.  Took Imodium "because she thought she had gas."  Reports L flank pain, described as sharp.  Denies dysuria.  No CVA tenderness upon assessment.  Denies n/v/d or fever.  "felt flushed at work."  Respirations even and unlabored.  "

## 2022-06-04 ENCOUNTER — HOSPITAL ENCOUNTER (EMERGENCY)
Facility: OTHER | Age: 47
Discharge: HOME | End: 2022-06-05
Attending: EMERGENCY MEDICINE
Payer: MEDICAID

## 2022-06-04 DIAGNOSIS — S46.912A SHOULDER STRAIN, LEFT, INITIAL ENCOUNTER: Primary | ICD-10-CM

## 2022-06-04 DIAGNOSIS — M25.519 SHOULDER PAIN: ICD-10-CM

## 2022-06-04 LAB
B-HCG UR QL: NEGATIVE
CTP QC/QA: YES

## 2022-06-04 PROCEDURE — 25000003 PHARM REV CODE 250: Performed by: EMERGENCY MEDICINE

## 2022-06-04 PROCEDURE — 81025 URINE PREGNANCY TEST: CPT | Performed by: EMERGENCY MEDICINE

## 2022-06-04 PROCEDURE — 99284 EMERGENCY DEPT VISIT MOD MDM: CPT | Mod: 25

## 2022-06-04 RX ORDER — HYDROCODONE BITARTRATE AND ACETAMINOPHEN 5; 325 MG/1; MG/1
1 TABLET ORAL
Status: COMPLETED | OUTPATIENT
Start: 2022-06-04 | End: 2022-06-04

## 2022-06-04 RX ORDER — CYCLOBENZAPRINE HCL 10 MG
10 TABLET ORAL 3 TIMES DAILY PRN
Qty: 15 TABLET | Refills: 0 | Status: SHIPPED | OUTPATIENT
Start: 2022-06-04 | End: 2022-06-09

## 2022-06-04 RX ORDER — KETOROLAC TROMETHAMINE 10 MG/1
10 TABLET, FILM COATED ORAL EVERY 6 HOURS
Qty: 20 TABLET | Refills: 0 | Status: SHIPPED | OUTPATIENT
Start: 2022-06-04 | End: 2022-06-09

## 2022-06-04 RX ADMIN — HYDROCODONE BITARTRATE AND ACETAMINOPHEN 1 TABLET: 5; 325 TABLET ORAL at 10:06

## 2022-06-05 VITALS
HEIGHT: 64 IN | RESPIRATION RATE: 18 BRPM | SYSTOLIC BLOOD PRESSURE: 140 MMHG | OXYGEN SATURATION: 99 % | DIASTOLIC BLOOD PRESSURE: 90 MMHG | BODY MASS INDEX: 30.39 KG/M2 | HEART RATE: 90 BPM | TEMPERATURE: 98 F | WEIGHT: 178 LBS

## 2022-06-05 NOTE — ED PROVIDER NOTES
"Encounter Date: 6/4/2022       History     Chief Complaint   Patient presents with    Shoulder Pain     Pt slipped and fell yesterday with her left arm and now has pain in left shoulder     46-year-old female presents complaining of left shoulder pain since yesterday.  The patient states that she 3 steps onto her left side and tried to break her fall with her hand.  She states the pain is like a burning sensation that radiates up her neck down her arm.  Interventions at home included ice and rolled once he.  She denies any other complaints at this time.  No previous injuries or procedures to the shoulder.        Review of patient's allergies indicates:   Allergen Reactions    Compazine [prochlorperazine] Other (See Comments)     "I had a stroke-like reaction"     Past Medical History:   Diagnosis Date    Chronic back pain      No past surgical history on file.  No family history on file.  Social History     Tobacco Use    Smoking status: Current Every Day Smoker     Packs/day: 0.50     Types: Cigarettes    Smokeless tobacco: Never Used   Substance Use Topics    Alcohol use: No    Drug use: No     Review of Systems   Constitutional: Negative for chills and fever.   Respiratory: Negative for cough, chest tightness, shortness of breath and wheezing.    Cardiovascular: Negative for chest pain.   Gastrointestinal: Negative for abdominal pain, diarrhea, nausea and vomiting.   Genitourinary: Negative for flank pain.   Musculoskeletal: Negative for back pain, myalgias, neck pain and neck stiffness.   Neurological: Negative for dizziness, weakness and headaches.   All other systems reviewed and are negative.      Physical Exam     Initial Vitals [06/04/22 2223]   BP Pulse Resp Temp SpO2   (!) 142/99 91 18 98.2 °F (36.8 °C) 99 %      MAP       --         Physical Exam    Nursing note and vitals reviewed.  Constitutional: She appears well-developed and well-nourished. She is not diaphoretic. No distress.   HENT:   Head: " Normocephalic and atraumatic.   Right Ear: External ear normal.   Left Ear: External ear normal.   Nose: Nose normal.   Eyes: Conjunctivae and EOM are normal. Right eye exhibits no discharge. Left eye exhibits no discharge. No scleral icterus.   Neck: Neck supple. No tracheal deviation present.   Pulmonary/Chest: No stridor.   Tenderness over the distal aspect of the left clavicle with no skin tenting or palpable deformity   Musculoskeletal:         General: No edema.      Cervical back: Neck supple.      Comments: Left upper extremity:  Shoulder range of motion limited in all directions (internal and external rotation as well as abduction).   5/5 hand , tenderness to palpation of the left shoulder and left trapezius muscle, no tenderness to palpation of the elbow/forearm/wrist or hand.  2+ radial pulse.  Sensation grossly intact.  No ecchymosis or edema.  No obvious deformity     Neurological: She is alert and oriented to person, place, and time. She has normal strength. GCS score is 15. GCS eye subscore is 4. GCS verbal subscore is 5. GCS motor subscore is 6.   Skin: Skin is warm.   Psychiatric: She has a normal mood and affect. Thought content normal.         ED Course   Procedures  Labs Reviewed   POCT URINE PREGNANCY          Imaging Results          X-Ray Shoulder Trauma Left (Final result)  Result time 06/04/22 23:38:32    Final result by David Stone MD (06/04/22 23:38:32)                 Impression:      No radiographic evidence of acute displaced fracture or dislocation of the left shoulder.      Electronically signed by: David Stone MD  Date:    06/04/2022  Time:    23:38             Narrative:    EXAMINATION:  XR SHOULDER TRAUMA 3 VIEW LEFT    CLINICAL HISTORY:  Pain in unspecified shoulder    TECHNIQUE:  Three views of the left shoulder were performed.    COMPARISON  None    FINDINGS:  There is no radiographic evidence of acute displaced fracture.  Glenohumeral alignment appears  appropriately maintained without evidence of dislocation.  There is mild degenerative osteoarthrosis of the acromioclavicular joint.  No definite displaced left-sided rib fractures appreciated radiographically.                              X-Rays:   Independently Interpreted Readings:   Other Readings:  Shoulder x-ray:  No shoulder dislocation /fracture.  No clavicular fracture    Medications   HYDROcodone-acetaminophen 5-325 mg per tablet 1 tablet (1 tablet Oral Given 6/4/22 3001)        Additional MDM:   Comments: 46-year-old female presents with left shoulder pain.  Range of motion left shoulder is limited secondary to pain.  She has bony tenderness along shoulder and distal clavicle only.  She is neurovascularly intact.  Will obtain x-ray of the shoulder.    11:52 PM  X-ray negative for fracture and/or dislocation.  Possible ligamentous injury verses muscle strain. Will treat with anti-inflammatory medication and Flexeril.  PCP follow-up for re-evaluation if pain persists despite these interventions..                    Clinical Impression:   Final diagnoses:  [M25.519] Shoulder pain  [S46.912A] Shoulder strain, left, initial encounter (Primary)          ED Disposition Condition    Discharge Stable        ED Prescriptions     Medication Sig Dispense Start Date End Date Auth. Provider    cyclobenzaprine (FLEXERIL) 10 MG tablet Take 1 tablet (10 mg total) by mouth 3 (three) times daily as needed for Muscle spasms. 15 tablet 6/4/2022 6/9/2022 Rosalva Spicer MD    ketorolac (TORADOL) 10 mg tablet Take 1 tablet (10 mg total) by mouth every 6 (six) hours. for 5 days 20 tablet 6/4/2022 6/9/2022 Rosalva Spicer MD        Follow-up Information     Follow up With Specialties Details Why Contact Info    Primary care  Schedule an appointment as soon as possible for a visit in 1 week As needed if symptoms persist            Rosalva Spicer MD  06/04/22 [id number]       Rosalva Spicer MD  06/04/22 [id number]       Rosalva Spicer MD  06/05/22  0001

## 2022-06-05 NOTE — ED TRIAGE NOTES
Pt. Arrived tot he ER complaining of left shoulder pain after falling down 3 steps yesterday. Pt. Stated it hurts to move it.

## 2023-01-28 ENCOUNTER — HOSPITAL ENCOUNTER (EMERGENCY)
Facility: OTHER | Age: 48
Discharge: HOME | End: 2023-01-29
Attending: EMERGENCY MEDICINE
Payer: MEDICAID

## 2023-01-28 DIAGNOSIS — S83.91XA SPRAIN OF RIGHT KNEE, UNSPECIFIED LIGAMENT, INITIAL ENCOUNTER: Primary | ICD-10-CM

## 2023-01-28 DIAGNOSIS — M25.569 KNEE PAIN: ICD-10-CM

## 2023-01-28 DIAGNOSIS — M25.579 ANKLE PAIN: ICD-10-CM
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
## 2023-01-28 LAB
B-HCG UR QL: NEGATIVE
CTP QC/QA: YES

## 2023-01-28 PROCEDURE — 81025 URINE PREGNANCY TEST: CPT | Performed by: EMERGENCY MEDICINE

## 2023-01-28 PROCEDURE — 25000003 PHARM REV CODE 250: Performed by: EMERGENCY MEDICINE

## 2023-01-28 PROCEDURE — 99283 EMERGENCY DEPT VISIT LOW MDM: CPT

## 2023-01-28 RX ORDER — HYDROCODONE BITARTRATE AND ACETAMINOPHEN 5; 325 MG/1; MG/1
1 TABLET ORAL
Status: COMPLETED | OUTPATIENT
Start: 2023-01-28 | End: 2023-01-28

## 2023-01-28 RX ORDER — KETOROLAC TROMETHAMINE 10 MG/1
10 TABLET, FILM COATED ORAL
Status: COMPLETED | OUTPATIENT
Start: 2023-01-28 | End: 2023-01-28

## 2023-01-28 RX ADMIN — HYDROCODONE BITARTRATE AND ACETAMINOPHEN 1 TABLET: 5; 325 TABLET ORAL at 11:01

## 2023-01-28 RX ADMIN — KETOROLAC TROMETHAMINE 10 MG: 10 TABLET, FILM COATED ORAL at 11:01

## 2023-01-28 NOTE — Clinical Note
"Fani Vaca" Lubna was seen and treated in our emergency department on 1/28/2023.  She may return to work on 01/31/2023.       If you have any questions or concerns, please don't hesitate to call.      Will Gamez LPN    "

## 2023-01-29 VITALS
TEMPERATURE: 98 F | SYSTOLIC BLOOD PRESSURE: 154 MMHG | HEIGHT: 64 IN | WEIGHT: 186 LBS | OXYGEN SATURATION: 99 % | HEART RATE: 78 BPM | RESPIRATION RATE: 18 BRPM | DIASTOLIC BLOOD PRESSURE: 81 MMHG | BODY MASS INDEX: 31.76 KG/M2

## 2023-01-29 RX ORDER — IBUPROFEN 800 MG/1
800 TABLET ORAL EVERY 8 HOURS PRN
Qty: 20 TABLET | Refills: 0 | Status: SHIPPED | OUTPATIENT
Start: 2023-01-29

## 2023-01-29 NOTE — ED NOTES
Pt provided with urine specimen cup, pt unable to ambulate to wheelchair, pt given bed pan to provide a specimen. Will continue to monitor.

## 2023-01-29 NOTE — ED PROVIDER NOTES
"Encounter Date: 1/28/2023    SCRIBE #1 NOTE: I, Gissel Vogt, am scribing for, and in the presence of,  Rosalva Spicer MD. I have scribed the following portions of the note - Other sections scribed: HPI, ROS, PE.     History     Chief Complaint   Patient presents with    Leg Pain     Patient reports she tipped over dog gate approx. 20 mins ago, noted bilateral leg pain (R>L), possible deformity noted to RLE. (+) RLE spasm and numbness sensation. Painful to bear weight     Fani Calvo is a 47 y.o. female, with no pertinent PMHx, who presents to the ED s/p fall for evaluation of "excruciating" 10/10 intensity diffuse bilateral leg pain, R>L. PT reports she tripped over a dog gate just PTA and fell forward, landing on her knees and hands. No head strike or syncope. She does endorse wrist pain but states her most severe pain is her inner right knee, describing her pain as a "knot" sensation. She also reports a tight sensation along her posterior right knee ("it feels like a rubber band pulling.") She states she has had difficulty with weight bearing secondary to pain, also noting right leg numbness when attempting to bear weight. Otherwise states her bilateral lower extremities feel "heavy" at rest. Her pain worsens when ranging. She has no surgical history of either knees. Denies fever, chills, pain elsewhere, and other somatic complaints. This is the extent of the patient's complaints at this time.    The history is provided by the patient.   Review of patient's allergies indicates:   Allergen Reactions    Compazine [prochlorperazine] Other (See Comments)     "I had a stroke-like reaction"     Past Medical History:   Diagnosis Date    Chronic back pain      History reviewed. No pertinent surgical history.  History reviewed. No pertinent family history.  Social History     Tobacco Use    Smoking status: Every Day     Packs/day: 0.50     Types: Cigarettes    Smokeless tobacco: Never   Substance Use Topics    Alcohol " use: No    Drug use: No     Review of Systems   Constitutional:  Negative for chills and fever.   HENT:  Negative for congestion and sore throat.    Eyes:  Negative for visual disturbance.   Respiratory:  Negative for cough and shortness of breath.    Cardiovascular:  Negative for chest pain and palpitations.   Gastrointestinal:  Negative for abdominal pain, diarrhea and vomiting.   Genitourinary:  Negative for decreased urine volume, dysuria and vaginal discharge.   Musculoskeletal:  Positive for arthralgias. Negative for joint swelling, neck pain and neck stiffness.   Skin:  Negative for rash and wound.   Neurological:  Negative for weakness, numbness and headaches.   Psychiatric/Behavioral:  Negative for confusion.      Physical Exam     Initial Vitals [01/28/23 2240]   BP Pulse Resp Temp SpO2   (!) 168/84 97 20 98.1 °F (36.7 °C) 99 %      MAP       --         Physical Exam    Constitutional: She appears well-developed and well-nourished. She does not have a sickly appearance. No distress.   HENT:   Head: Normocephalic and atraumatic.   Right Ear: External ear normal.   Left Ear: External ear normal.   Eyes: Conjunctivae, EOM and lids are normal. Right eye exhibits no discharge. Left eye exhibits no discharge. Right conjunctiva is not injected. Right conjunctiva has no hemorrhage. Left conjunctiva is not injected. Left conjunctiva has no hemorrhage. No scleral icterus.   Neck: Phonation normal. No stridor present. No tracheal deviation present.   Normal range of motion.  Cardiovascular:  Normal rate, regular rhythm and normal heart sounds.     Exam reveals no friction rub.       No murmur heard.  Pulses:       Radial pulses are 2+ on the right side and 2+ on the left side.        Dorsalis pedis pulses are 2+ on the right side and 2+ on the left side.   Pulmonary/Chest: Breath sounds normal. No respiratory distress. She has no wheezes. She has no rhonchi. She has no rales.   Musculoskeletal:         General:  Tenderness present.      Cervical back: Normal range of motion.      Comments: Diffuse tenderness of both knees. Diffuse tenderness to proximal anterior left thigh. Tenderness to distal right lower leg. No obvious deformity. Sensation grossly intact. Full ROM of bilateral knees and ankles. Knee stable.      Neurological: She is alert and oriented to person, place, and time. She has normal strength. GCS eye subscore is 4. GCS verbal subscore is 5. GCS motor subscore is 6.   Skin: Skin is warm.   Psychiatric: She has a normal mood and affect. Her speech is normal and behavior is normal. Judgment and thought content normal. Cognition and memory are normal.       ED Course   Procedures  Labs Reviewed   POCT URINE PREGNANCY          Imaging Results              X-Ray Ankle Complete Right (Final result)  Result time 01/29/23 00:12:47      Final result by Arturo Riddle MD (01/29/23 00:12:47)                   Impression:      No acute process.      Electronically signed by: Arturo Riddle MD  Date:    01/29/2023  Time:    00:12               Narrative:    EXAMINATION:  XR ANKLE COMPLETE 3 VIEW RIGHT    CLINICAL HISTORY:  Pain in unspecified ankle and joints of unspecified foot    TECHNIQUE:  AP, lateral, and oblique images of the right ankle were performed.    COMPARISON:  07/06/2010.    FINDINGS:  The bone mineralization is within normal limits.  There is no cortical step-off.  There is no evidence of periostitis.    There is a dorsal osteophyte overlying the tarsal bones.  There is a plantar calcaneal spur present.  The joint spaces are maintained.  The soft tissues are unremarkable.  No radiopaque foreign body is identified.    There is no evidence of a fracture or dislocation.                                       X-Ray Tibia Fibula Bilateral (Final result)  Result time 01/29/23 00:19:16      Final result by Arturo Riddle MD (01/29/23 00:19:16)                   Impression:      No acute process.      Electronically signed  by: Arturo Riddle MD  Date:    01/29/2023  Time:    00:19               Narrative:    EXAMINATION:  XR TIBIA FIBULA BILATERAL    CLINICAL HISTORY:  Unspecified fall, initial encounter    TECHNIQUE:  Two x-ray views of both tibia and fibula.    COMPARISON:  10/03/2011.    FINDINGS:  Right:    The bone mineralization is within normal limits.  There is no cortical step-off.  There is no evidence of periostitis.    The joint spaces are maintained.  The soft tissues are unremarkable.  No radiopaque foreign body is identified.    There is no evidence of a fracture or dislocation.    Left:    The bone mineralization is within normal limits.  There is no cortical step-off.  There is no evidence of periostitis.    The joint spaces are maintained.  The soft tissues are unremarkable.  No radiopaque foreign body is identified.    There is no evidence of a fracture or dislocation.                                       X-Ray Knee 1 or 2 View Bilateral (Final result)  Result time 01/29/23 00:15:52   Procedure changed from X-Ray Knee 3 View Bilateral     Final result by Arturo Riddle MD (01/29/23 00:15:52)                   Impression:      No acute process.      Electronically signed by: Arturo Riddle MD  Date:    01/29/2023  Time:    00:15               Narrative:    EXAMINATION:  XR KNEE 1 OR 2 VIEW BILATERAL    CLINICAL HISTORY:  pain;  Pain in unspecified knee    TECHNIQUE:  Two x-ray views of both knees.    COMPARISON:  X-ray of the right knee dated 07/02/2020.    X-ray of the left knee dated 04/16/2017.    FINDINGS:  Right knee:    The bone mineralization is within normal limits.  There is no cortical step-off.  There is no evidence of periostitis.    The joint spaces are maintained.  The soft tissues are unremarkable.  No radiopaque foreign body is identified.    There is no evidence of a fracture or dislocation.    Left knee:    The bone mineralization is within normal limits.  There is no cortical step-off.  There is no  evidence of periostitis.    The joint spaces are maintained.  The soft tissues are unremarkable.  No radiopaque foreign body is identified.    There is no evidence of a fracture or dislocation.                                       X-Ray Femur Ap/Lat Bilateral (Final result)  Result time 01/29/23 00:23:14      Final result by Arturo Riddle MD (01/29/23 00:23:14)                   Impression:      No acute process.      Electronically signed by: Arturo Riddle MD  Date:    01/29/2023  Time:    00:23               Narrative:    EXAMINATION:  XR FEMUR 2 VIEW BILATERAL    CLINICAL HISTORY:  Unspecified fall, initial encounter    TECHNIQUE:  Two x-ray views of both femurs.    COMPARISON:  None    FINDINGS:  Right femur:    The bone mineralization is within normal limits.  There is no cortical step-off.  There is no evidence of periostitis.    The joint spaces are maintained.  The soft tissues are unremarkable.  No radiopaque foreign body is identified.    There is no evidence of a fracture or dislocation.    Left femur:    The bone mineralization is within normal limits.  There is no cortical step-off.  There is no evidence of periostitis.    The joint spaces are maintained.  The soft tissues are unremarkable.  No radiopaque foreign body is identified.    There is no evidence of a fracture or dislocation.                                    X-Rays:   Independently Interpreted Readings:   Other Readings:  No fracture. No dislocation.  Medications   HYDROcodone-acetaminophen 5-325 mg per tablet 1 tablet (1 tablet Oral Given 1/28/23 2323)   ketorolac tablet 10 mg (10 mg Oral Given 1/28/23 2323)     Medical Decision Making:   History:   Old Medical Records: I decided to obtain old medical records.  Independently Interpreted Test(s):   I have ordered and independently interpreted X-rays - see prior notes.  Clinical Tests:   Lab Tests: Ordered and Reviewed  Radiological Study: Ordered and Reviewed  Additional MDM:   Comments:  47-year-old female presents for evaluation status post fall just prior to arrival.  She complains of pain to both legs involving the knees, lower legs, and right ankle.  On exam she had diffuse tenderness to palpation but full range of motion against resistance, no obvious deformity and intact sensation.  X-rays were obtained and showed no evidence of a fracture or dislocation.  After receiving Norco the primary location of her pain is along the medial aspect of the right knee.  Patient was counseled on home care instructions for knee sprain.  Will apply Ace wrap and give crutches with weight-bearing as tolerated.  Prescription for Motrin also given.  Patient was instructed to follow-up with her PCP in 1 week if symptoms persist..      Scribe Attestation:   Scribe #1: I performed the above scribed service and the documentation accurately describes the services I performed. I attest to the accuracy of the note.    I, Rosalva Spicer , personally performed the services described in this documentation. All medical record entries made by the scribe were at my direction and in my presence. I have reviewed the chart and agree that the record reflects my personal performance and is accurate and complete.                   Clinical Impression:   Final diagnoses:  [M25.569] Knee pain  [W19.XXXA] Fall  [M25.579] Ankle pain  [S83.91XA] Sprain of right knee, unspecified ligament, initial encounter (Primary)        ED Disposition Condition    Discharge Stable          ED Prescriptions       Medication Sig Dispense Start Date End Date Auth. Provider    ibuprofen (ADVIL,MOTRIN) 800 MG tablet Take 1 tablet (800 mg total) by mouth every 8 (eight) hours as needed for Pain. 20 tablet 1/29/2023 -- Rosalva Spicer MD          Follow-up Information       Follow up With Specialties Details Why Contact Info    Primary care  Schedule an appointment as soon as possible for a visit in 1 week As needed if symptoms persist              Rosalva Spicer,  MD  01/29/23 0037       Rosalva Spicer MD  01/29/23 0104

## 2023-01-29 NOTE — ED TRIAGE NOTES
Pt fell over dog gate this evening, causing her to fall forward. C/o wrist and knee pain, painful to walk, limited ROM due to pain.

## 2023-07-03 ENCOUNTER — HOSPITAL ENCOUNTER (EMERGENCY)
Facility: OTHER | Age: 48
Discharge: HOME | End: 2023-07-03
Attending: EMERGENCY MEDICINE
Payer: MEDICAID

## 2023-07-03 VITALS
WEIGHT: 178 LBS | TEMPERATURE: 98 F | HEIGHT: 64 IN | OXYGEN SATURATION: 98 % | BODY MASS INDEX: 30.39 KG/M2 | RESPIRATION RATE: 18 BRPM | SYSTOLIC BLOOD PRESSURE: 164 MMHG | DIASTOLIC BLOOD PRESSURE: 90 MMHG | HEART RATE: 81 BPM

## 2023-07-03 DIAGNOSIS — L02.411 ABSCESS OF RIGHT AXILLA: Primary | ICD-10-CM

## 2023-07-03 DIAGNOSIS — R03.0 ELEVATED BLOOD PRESSURE READING IN OFFICE WITHOUT DIAGNOSIS OF HYPERTENSION: ICD-10-CM

## 2023-07-03 PROCEDURE — 25000003 PHARM REV CODE 250: Performed by: EMERGENCY MEDICINE

## 2023-07-03 PROCEDURE — 99283 EMERGENCY DEPT VISIT LOW MDM: CPT | Mod: 25

## 2023-07-03 PROCEDURE — 10060 I&D ABSCESS SIMPLE/SINGLE: CPT

## 2023-07-03 RX ORDER — CLINDAMYCIN HYDROCHLORIDE 300 MG/1
300 CAPSULE ORAL 3 TIMES DAILY
Qty: 21 CAPSULE | Refills: 0 | Status: SHIPPED | OUTPATIENT
Start: 2023-07-03

## 2023-07-03 RX ORDER — LIDOCAINE HYDROCHLORIDE 10 MG/ML
10 INJECTION INFILTRATION; PERINEURAL
Status: COMPLETED | OUTPATIENT
Start: 2023-07-03 | End: 2023-07-03

## 2023-07-03 RX ADMIN — LIDOCAINE HYDROCHLORIDE 10 ML: 10 INJECTION, SOLUTION INFILTRATION; PERINEURAL at 01:07

## 2023-07-03 NOTE — FIRST PROVIDER EVALUATION
" Emergency Department TeleTriage Encounter Note      CHIEF COMPLAINT    Chief Complaint   Patient presents with    Abscess     Abscess to R underarm. Reports hx of abscess' to the same area. Reports noticing a small bump to area a few days that has significantly progressed in size. Reports pain moving R arm.        VITAL SIGNS   Initial Vitals [07/03/23 1209]   BP Pulse Resp Temp SpO2   (!) 180/97 89 20 98.4 °F (36.9 °C) 97 %      MAP       --            ALLERGIES    Review of patient's allergies indicates:   Allergen Reactions    Compazine [prochlorperazine] Other (See Comments)     "I had a stroke-like reaction"       PROVIDER TRIAGE NOTE  Patient presents with abscess to right axilla. Denies DM or HTN. BP is elevated today.       ORDERS  Labs Reviewed - No data to display    ED Orders (720h ago, onward)      None              Virtual Visit Note: The provider triage portion of this emergency department evaluation and documentation was performed via [employer], a HIPAA-compliant telemedicine application, in concert with a tele-presenter in the room. A face to face patient evaluation with one of my colleagues will occur once the patient is placed in an emergency department room.      DISCLAIMER: This note was prepared with [employer] voice recognition transcription software. Garbled syntax, mangled pronouns, and other bizarre constructions may be attributed to that software system.    "

## 2023-07-03 NOTE — ED PROVIDER NOTES
"SCRIBE #1 NOTE: I, González Kay, am scribing for, and in the presence of,  Angle Tsai MD. I have scribed the following portions of the note - Other sections scribed: HPI, ROS, PE.       Source of History:  Patient.    Chief complaint:  Abscess (Abscess to R underarm. Reports hx of abscess' to the same area. Reports noticing a small bump to area a few days that has significantly progressed in size. Reports pain moving R arm. )      HPI:  Fani Calvo is a 47 y.o. female presenting with an abscess that she noticed around 5 days ago. She stats that she recently changed her deodorant and thinks that it may have irritated her skin. Additionally, she states that it felt hard after a day of walking around and having her arm rub on it. She also notes her throat feeling somewhat swollen. She denies any fevers and states that her appetite has not changed. Patient denies any other complaints at this time.    This is the extent to the patients complaints today here in the emergency department.    ROS: As per HPI and below:  Review of Systems   Constitutional: No fever.   HENT: No sore throat.    Eyes: No visual disturbance.   Respiratory: No cough. No shortness of breath.    Cardiovascular: No chest pain.   Gastrointestinal: No abdominal pain.  No nausea.  No vomiting.  Genitourinary: No flank pain.  No abnormal urination.  Musculoskeletal: No back pain.   Skin: No rash. +abscess  Neurological: No weakness.  No headache.     Review of patient's allergies indicates:   Allergen Reactions    Compazine [prochlorperazine] Other (See Comments)     "I had a stroke-like reaction"       PMH:  As per HPI and below:  Past Medical History:   Diagnosis Date    Chronic back pain      No past surgical history on file.    Social History     Tobacco Use    Smoking status: Every Day     Packs/day: 0.50     Types: Cigarettes    Smokeless tobacco: Never   Substance Use Topics    Alcohol use: No    Drug use: No       Physical Exam:    BP (!) " "164/90 (BP Location: Left arm, Patient Position: Sitting)   Pulse 81   Temp 98.4 °F (36.9 °C) (Oral)   Resp 18   Ht 5' 4" (1.626 m)   Wt 80.7 kg (178 lb)   SpO2 98%   BMI 30.55 kg/m²   Nursing note and vital signs reviewed.  Appearance: No acute distress.  Eyes: EOM normal.  HENT: Atraumatic. Right axilla had 2cm tender and fluctuant mass. No drainage. Surrounding induration. Palpable lymph in axilla.  Neck: Normal ROM.  Cardio: Normal.  Pulmonary: No respiratory distress.  Musculoskeletal: Good range of motion of all joints.  Skin: Skin is dry. Submandibular adenopathy bilaterally. Oropharynx clear.  Neurological: Alert and oriented x 3.        I decided to obtain the patient's medical records.  Summary of Medical Records:  Most recent ED evaluation was January 2023 for right knee sprain.  Patient did have elevated blood pressure that time.        Procedure:  I & D - Incision and Drainage    Date/Time: 7/3/2023 1:48 PM  Location procedure was performed: [facility] EMERGENCY DEPARTMENT  Performed by: Angle Tsai MD  Authorized by: Angle Tsai MD     Anesthesia:  Local Anesthetic: lidocaine 1% without epinephrine  Comments: 4ml of Lidocaine 1% without epinephrine used.Incision expressed large volume of purulent material with blood. Wound probed with hemostats, location disrupted. Will discharge with warm hot presses and antibiotics.        Initial Impression  47 y.o. female with recurrent abscess and concern for hydradenitis. Plan for I&D, pain control, antibiotics due to associated cellulitis. Referral to Dr. Vargas for evaluation. Referral for primary care. Patient with elevated blood pressure, currently asymptomatic. Reschedule in 2 weeks. Avoid salt.    Differential Dx:  Abscess, lymphadenopathy, cellulitis, mass.    MDM:    Patient improved with treatment in the emergency department and comfortable going home. Discussed reasons to return and importance of followup.  Patient understands that the emergency visit " today is primarily to address immediate concerns and to rule out emergent cause of symptoms and that they may require further workup and evaluation as an outpatient. All questions addressed and patient given discharge instructions and followup information.     Patient was referred to the [facility] to establish primary care and follow-up blood pressure.        Scribe Attestation:   Scribe #1: I performed the above scribed service and the documentation accurately describes the services I performed. I attest to the accuracy of the note.  Physician Attestation for Scribe: I, Angle Tsai MD, reviewed documentation as scribed in my presence, which is both accurate and complete.     Diagnostic Impression:    1. Abscess of right axilla    2. Elevated blood pressure reading in office without diagnosis of hypertension         ED Disposition Condition    Discharge Stable            ED Prescriptions       Medication Sig Dispense Start Date End Date Auth. Provider    clindamycin (CLEOCIN) 300 MG capsule Take 1 capsule (300 mg total) by mouth 3 (three) times daily. 21 capsule 7/3/2023 -- Angle Tsai MD          Follow-up Information    None           Angle Tsai MD  07/04/23 5729

## 2023-07-05 ENCOUNTER — PATIENT OUTREACH (OUTPATIENT)
Dept: EMERGENCY MEDICINE | Facility: OTHER | Age: 48
End: 2023-07-05
Payer: MEDICAID

## 2023-07-05 NOTE — PROGRESS NOTES
Unable to reach patient for ED Navigation services due to number is incorrect in chart and does not belong to the patient. Closing encounter.

## 2025-02-10 ENCOUNTER — HOSPITAL ENCOUNTER (EMERGENCY)
Facility: OTHER | Age: 50
Discharge: HOME | End: 2025-02-10
Payer: MEDICAID

## 2025-02-10 VITALS
DIASTOLIC BLOOD PRESSURE: 104 MMHG | OXYGEN SATURATION: 100 % | HEIGHT: 65 IN | TEMPERATURE: 98 F | SYSTOLIC BLOOD PRESSURE: 194 MMHG | HEART RATE: 81 BPM | BODY MASS INDEX: 29.99 KG/M2 | WEIGHT: 180 LBS | RESPIRATION RATE: 14 BRPM

## 2025-02-10 DIAGNOSIS — R06.02 SHORTNESS OF BREATH: ICD-10-CM

## 2025-02-10 DIAGNOSIS — Z63.8: Primary | ICD-10-CM

## 2025-02-10 DIAGNOSIS — I10 HYPERTENSION, UNSPECIFIED TYPE: ICD-10-CM

## 2025-02-10 DIAGNOSIS — B34.9 VIRAL ILLNESS: ICD-10-CM

## 2025-02-10 DIAGNOSIS — R05.9 COUGH: ICD-10-CM

## 2025-02-10 LAB
ALBUMIN SERPL BCP-MCNC: 4.3 G/DL (ref 3.5–5.2)
ALP SERPL-CCNC: 73 U/L (ref 40–150)
ALT SERPL W/O P-5'-P-CCNC: 18 U/L (ref 10–44)
ANION GAP SERPL CALC-SCNC: 11 MMOL/L (ref 8–16)
AST SERPL-CCNC: 22 U/L (ref 10–40)
BASOPHILS # BLD AUTO: 0.03 K/UL (ref 0–0.2)
BASOPHILS NFR BLD: 0.4 % (ref 0–1.9)
BILIRUB SERPL-MCNC: 0.3 MG/DL (ref 0.1–1)
BNP SERPL-MCNC: 15 PG/ML (ref 0–99)
BUN SERPL-MCNC: 11 MG/DL (ref 6–20)
CALCIUM SERPL-MCNC: 10.1 MG/DL (ref 8.7–10.5)
CHLORIDE SERPL-SCNC: 107 MMOL/L (ref 95–110)
CO2 SERPL-SCNC: 21 MMOL/L (ref 23–29)
CREAT SERPL-MCNC: 1 MG/DL (ref 0.5–1.4)
CTP QC/QA: YES
CTP QC/QA: YES
D DIMER PPP IA.FEU-MCNC: 0.23 MG/L FEU
DIFFERENTIAL METHOD BLD: ABNORMAL
EOSINOPHIL # BLD AUTO: 0 K/UL (ref 0–0.5)
EOSINOPHIL NFR BLD: 0 % (ref 0–8)
ERYTHROCYTE [DISTWIDTH] IN BLOOD BY AUTOMATED COUNT: 13.4 % (ref 11.5–14.5)
EST. GFR  (NO RACE VARIABLE): >60 ML/MIN/1.73 M^2
GLUCOSE SERPL-MCNC: 120 MG/DL (ref 70–110)
HCT VFR BLD AUTO: 43 % (ref 37–48.5)
HCV AB SERPL QL IA: NEGATIVE
HGB BLD-MCNC: 14.8 G/DL (ref 12–16)
HIV 1+2 AB+HIV1 P24 AG SERPL QL IA: NEGATIVE
IMM GRANULOCYTES # BLD AUTO: 0.04 K/UL (ref 0–0.04)
IMM GRANULOCYTES NFR BLD AUTO: 0.6 % (ref 0–0.5)
LYMPHOCYTES # BLD AUTO: 1.1 K/UL (ref 1–4.8)
LYMPHOCYTES NFR BLD: 15.4 % (ref 18–48)
MCH RBC QN AUTO: 33.2 PG (ref 27–31)
MCHC RBC AUTO-ENTMCNC: 34.4 G/DL (ref 32–36)
MCV RBC AUTO: 96 FL (ref 82–98)
MONOCYTES # BLD AUTO: 0.1 K/UL (ref 0.3–1)
MONOCYTES NFR BLD: 0.9 % (ref 4–15)
NEUTROPHILS # BLD AUTO: 5.7 K/UL (ref 1.8–7.7)
NEUTROPHILS NFR BLD: 82.7 % (ref 38–73)
NRBC BLD-RTO: 0 /100 WBC
PLATELET # BLD AUTO: 383 K/UL (ref 150–450)
PMV BLD AUTO: 8.7 FL (ref 9.2–12.9)
POC MOLECULAR INFLUENZA A AGN: NEGATIVE
POC MOLECULAR INFLUENZA B AGN: NEGATIVE
POTASSIUM SERPL-SCNC: 4.4 MMOL/L (ref 3.5–5.1)
PROT SERPL-MCNC: 8.6 G/DL (ref 6–8.4)
RBC # BLD AUTO: 4.46 M/UL (ref 4–5.4)
SARS-COV-2 RDRP RESP QL NAA+PROBE: NEGATIVE
SODIUM SERPL-SCNC: 139 MMOL/L (ref 136–145)
TROPONIN I SERPL DL<=0.01 NG/ML-MCNC: <0.006 NG/ML (ref 0–0.03)
WBC # BLD AUTO: 6.87 K/UL (ref 3.9–12.7)

## 2025-02-10 PROCEDURE — 99285 EMERGENCY DEPT VISIT HI MDM: CPT | Mod: 25

## 2025-02-10 PROCEDURE — 87635 SARS-COV-2 COVID-19 AMP PRB: CPT

## 2025-02-10 PROCEDURE — 86803 HEPATITIS C AB TEST: CPT

## 2025-02-10 PROCEDURE — 83880 ASSAY OF NATRIURETIC PEPTIDE: CPT | Performed by: NURSE PRACTITIONER

## 2025-02-10 PROCEDURE — 87389 HIV-1 AG W/HIV-1&-2 AB AG IA: CPT

## 2025-02-10 PROCEDURE — 85379 FIBRIN DEGRADATION QUANT: CPT

## 2025-02-10 PROCEDURE — 85025 COMPLETE CBC W/AUTO DIFF WBC: CPT | Performed by: NURSE PRACTITIONER

## 2025-02-10 PROCEDURE — 80053 COMPREHEN METABOLIC PANEL: CPT | Performed by: NURSE PRACTITIONER

## 2025-02-10 PROCEDURE — 84484 ASSAY OF TROPONIN QUANT: CPT | Performed by: NURSE PRACTITIONER

## 2025-02-10 PROCEDURE — 25000003 PHARM REV CODE 250

## 2025-02-10 RX ORDER — AMLODIPINE BESYLATE 5 MG/1
5 TABLET ORAL
Status: COMPLETED | OUTPATIENT
Start: 2025-02-10 | End: 2025-02-10

## 2025-02-10 RX ORDER — GUAIFENESIN AND DEXTROMETHORPHAN HYDROBROMIDE 1200; 60 MG/1; MG/1
1 TABLET, EXTENDED RELEASE ORAL 2 TIMES DAILY PRN
Qty: 20 TABLET | Refills: 0 | Status: SHIPPED | OUTPATIENT
Start: 2025-02-10 | End: 2025-02-20

## 2025-02-10 RX ORDER — NAPROXEN 500 MG/1
500 TABLET ORAL 2 TIMES DAILY PRN
Qty: 20 TABLET | Refills: 0 | Status: SHIPPED | OUTPATIENT
Start: 2025-02-10

## 2025-02-10 RX ORDER — AMLODIPINE BESYLATE 5 MG/1
5 TABLET ORAL DAILY
Qty: 90 TABLET | Refills: 3 | Status: SHIPPED | OUTPATIENT
Start: 2025-02-10 | End: 2026-02-10

## 2025-02-10 RX ORDER — NAPROXEN 500 MG/1
500 TABLET ORAL
Status: COMPLETED | OUTPATIENT
Start: 2025-02-10 | End: 2025-02-10

## 2025-02-10 RX ADMIN — AMLODIPINE BESYLATE 5 MG: 5 TABLET ORAL at 07:02

## 2025-02-10 RX ADMIN — GUAIFENESIN AND DEXTROMETHORPHAN HYDROBROMIDE 1 TABLET: 600; 30 TABLET, EXTENDED RELEASE ORAL at 07:02

## 2025-02-10 RX ADMIN — AMLODIPINE BESYLATE 5 MG: 5 TABLET ORAL at 09:02

## 2025-02-10 RX ADMIN — NAPROXEN 500 MG: 500 TABLET ORAL at 10:02

## 2025-02-10 SDOH — SOCIAL DETERMINANTS OF HEALTH (SDOH): OTHER SPECIFIED PROBLEMS RELATED TO PRIMARY SUPPORT GROUP: Z63.8

## 2025-02-10 NOTE — FIRST PROVIDER EVALUATION
"Medical screening examination initiated.  I have conducted a focused provider triage encounter, findings are as follows:    Brief history of present illness:  intermittent headache with SOB since this morning. Pain woke her up this morning. Elevated blood pressure in triage. No hx of HTN. Visual changes.     Vitals:    02/10/25 1744   BP: (!) 205/110   Pulse: 100   Resp: 18   Temp: 98.2 °F (36.8 °C)   TempSrc: Oral   SpO2: 98%   Weight: 81.6 kg (180 lb)   Height: 5' 5" (1.651 m)       Pertinent physical exam:  no distress    Brief workup plan:  labs and CT head    Preliminary workup initiated; this workup will be continued and followed by the physician or advanced practice provider that is assigned to the patient when roomed.  "

## 2025-02-10 NOTE — Clinical Note
"Fani Vaca" Lubna was seen and treated in our emergency department on 2/10/2025.  She may return to work on 02/12/2025.       If you have any questions or concerns, please don't hesitate to call.      Mallika Zuñiga MD"

## 2025-02-11 NOTE — ED PROVIDER NOTES
"Encounter Date: 2/10/2025    SCRIBE #1 NOTE: I, Temitope Nassar, am scribing for, and in the presence of,  Mallika Zuñiga MD. I have scribed the following portions of the note - Other sections scribed: HPI.   SCRIBE #2 NOTE: I, Tremayne Martelaway, am scribing for, and in the presence of,  Mallika Zuñiga MD. I have scribed the remaining portions of the note not scribed by Scribe #1.     History     Chief Complaint   Patient presents with    Shortness of Breath     Pt reports shortness of breath on exertion with x 2 weeks with productive cough and night sweats and new onset of R side pain. Pt also complaining of generalized headache, /110     Time seen by provider: 6:37 PM    This is a 49 y.o. female who presents with complaint of shortness of breath. She states that a month ago her coworker was sick with the flu and she got sick shortly after assuming it was the flu and tried over the counter treatments. She states that during this time she has had a progressively worsening cough, and not able to get any relief. She states spitting up a thick fluid and pain in her back with deep inspiration. She reports mild dyspnea on exertion for the past 2 days.  She denies any vomiting, nausea, abdominal pain, or chest pain. She states experiencing sweats but reports being greta-menopausal. She reports needing a new PCP.      This is the extent of the patient's complaints at this time.        The history is provided by the patient.     Review of patient's allergies indicates:   Allergen Reactions    Compazine [prochlorperazine] Other (See Comments)     "I had a stroke-like reaction"     Past Medical History:   Diagnosis Date    Chronic back pain      History reviewed. No pertinent surgical history.  No family history on file.  Social History     Tobacco Use    Smoking status: Every Day     Current packs/day: 0.50     Types: Cigarettes    Smokeless tobacco: Never   Substance Use Topics    Alcohol use: No    Drug use: No "         Physical Exam     Initial Vitals [02/10/25 1744]   BP Pulse Resp Temp SpO2   (!) 205/110 100 18 98.2 °F (36.8 °C) 98 %      MAP       --         Physical Exam    Nursing note and vitals reviewed.  Constitutional: She is not diaphoretic. No distress.   HENT:   Head: Normocephalic and atraumatic.   Eyes: Conjunctivae and EOM are normal. Pupils are equal, round, and reactive to light.   Cardiovascular:  Normal rate and regular rhythm.           Pulmonary/Chest: No respiratory distress. She has no wheezes. She has no rhonchi. She has no rales. She exhibits no tenderness.   Intermittent cough on exam   Abdominal: Abdomen is soft. She exhibits no distension. There is no abdominal tenderness. There is no rebound and no guarding.   Musculoskeletal:         General: No edema. Normal range of motion.     Neurological: She is alert.   Skin: Skin is warm and dry.   Psychiatric: Thought content normal.   Tearful, grieving.  Denies SI         ED Course   Procedures  Labs Reviewed   CBC W/ AUTO DIFFERENTIAL - Abnormal       Result Value    WBC 6.87      RBC 4.46      Hemoglobin 14.8      Hematocrit 43.0      MCV 96      MCH 33.2 (*)     MCHC 34.4      RDW 13.4      Platelets 383      MPV 8.7 (*)     Immature Granulocytes 0.6 (*)     Gran # (ANC) 5.7      Immature Grans (Abs) 0.04      Lymph # 1.1      Mono # 0.1 (*)     Eos # 0.0      Baso # 0.03      nRBC 0      Gran % 82.7 (*)     Lymph % 15.4 (*)     Mono % 0.9 (*)     Eosinophil % 0.0      Basophil % 0.4      Differential Method Automated      Narrative:     Release to patient->Immediate   COMPREHENSIVE METABOLIC PANEL - Abnormal    Sodium 139      Potassium 4.4      Chloride 107      CO2 21 (*)     Glucose 120 (*)     BUN 11      Creatinine 1.0      Calcium 10.1      Total Protein 8.6 (*)     Albumin 4.3      Total Bilirubin 0.3      Alkaline Phosphatase 73      AST 22      ALT 18      eGFR >60      Anion Gap 11      Narrative:     Release to patient->Immediate    TROPONIN I    Troponin I <0.006      Narrative:     Release to patient->Immediate   B-TYPE NATRIURETIC PEPTIDE    BNP 15      Narrative:     Release to patient->Immediate   HEPATITIS C ANTIBODY    Hepatitis C Ab Negative      Narrative:     Release to patient->Immediate   HIV 1 / 2 ANTIBODY    HIV 1/2 Ag/Ab Negative      Narrative:     Release to patient->Immediate   D DIMER, QUANTITATIVE    D-Dimer 0.23     D DIMER, QUANTITATIVE   SARS-COV-2 RDRP GENE    POC Rapid COVID Negative       Acceptable Yes     POCT INFLUENZA A/B MOLECULAR    POC Molecular Influenza A Ag Negative      POC Molecular Influenza B Ag Negative       Acceptable Yes            Imaging Results              CT Head Without Contrast (Final result)  Result time 02/10/25 19:16:43      Final result by Richard Dunne MD (02/10/25 19:16:43)                   Impression:      No acute intracranial abnormalities identified.      Electronically signed by: Richard Dunne MD  Date:    02/10/2025  Time:    19:16               Narrative:    EXAMINATION:  CT HEAD WITHOUT CONTRAST    CLINICAL HISTORY:  Headache, new or worsening (Age >= 50y);    TECHNIQUE:  Low dose axial images were obtained through the head.  Coronal and sagittal reformations were also performed. Contrast was not administered.    COMPARISON:  None.    FINDINGS:  No evidence of acute/recent major vascular distribution cerebral infarction, intraparenchymal hemorrhage, or intra-axial space occupying lesion. The ventricular system is normal in size and configuration with no evidence of hydrocephalus. No effacement of the skull-base cisterns. No abnormal extra-axial fluid collections or blood products. Visualized paranasal sinuses and mastoid air cells are clear. The calvarium shows no significant abnormality.                                       X-Ray Chest PA And Lateral (Final result)  Result time 02/10/25 18:58:52      Final result by Richard Dunne MD  (02/10/25 18:58:52)                   Impression:      No acute cardiopulmonary process identified.      Electronically signed by: Richard Dunne MD  Date:    02/10/2025  Time:    18:58               Narrative:    EXAMINATION:  XR CHEST PA AND LATERAL    CLINICAL HISTORY:  Cough, unspecified    TECHNIQUE:  Frontal view of the chest was performed.  Lateral view was not provided.    COMPARISON:  February 2019.    FINDINGS:  Cardiac silhouette is normal in size.  Lungs are symmetrically expanded.  No evidence of focal consolidative process, pneumothorax, or significant pleural effusion.  No acute osseous abnormality identified.                                       Medications   amLODIPine tablet 5 mg (5 mg Oral Given 2/10/25 1918)   dextromethorphan-guaiFENesin  mg per 12 hr tablet 1 tablet (1 tablet Oral Given 2/10/25 1957)   amLODIPine tablet 5 mg (5 mg Oral Given 2/10/25 2131)   naproxen tablet 500 mg (500 mg Oral Given 2/10/25 2215)     Medical Decision Making  This is a 49 y.o. female who presents with complaint of dyspnea on exertion for the past two days. Upon arrival to the ED she is hypertensive and tachycardic, but otherwise hemodynamically stable, afebrile, and non-toxic appearing. She reports coping from the sudden loss of her son 2 months ago. Her chronic cough has been going on for 1 month and unrelieved with over-the-counter medications.  Cardiac workup and D-dimer ordered to evaluate for possible PE.  Chest x-ray to ensure no focal consolidation or pneumonia.  She is satting normally on room air lungs are clear to auscultation bilaterally.  She is hypertensive on arrival without known history of hypertension or currently being on medications.  Will trial 5 mg amlodipine.  Suspect some component of anxiety, stress contributing to the elevated blood pressure.  She is in need of a primary care physician so will provide resources for follow up upon discharge, should patient workup be  reassuring.    Amount and/or Complexity of Data Reviewed  External Data Reviewed: notes.  Labs: ordered. Decision-making details documented in ED Course.  Radiology: ordered and independent interpretation performed. Decision-making details documented in ED Course.  ECG/medicine tests: ordered and independent interpretation performed. Decision-making details documented in ED Course.    Risk  OTC drugs.  Prescription drug management.            Scribe Attestation:   Scribe #1: I performed the above scribed service and the documentation accurately describes the services I performed. I attest to the accuracy of the note.  Scribe #2: I performed the above scribed service and the documentation accurately describes the services I performed. I attest to the accuracy of the note.        ED Course as of 02/10/25 2315   Mon Feb 10, 2025   1857 Patient is postmenopausal. UPT dc.  [KL]   1955 CBC auto differential(!)  CBC is grossly unremarkable.  No leukocytosis. Chronic, stable anemia.  [KL]   2104 BP(!): 153/90  Blood pressure improved after amlodipine. [KL]   2138 D-Dimer: 0.23  D-dimer normal normal.  Not concerned for PE. [KL]   2138 BP(!): 204/110  Patient reportedly became anxious again.  Blood pressure elevated.  Will order additional amlodipine. [KL]   2138 All results were discussed with patient. Strict ED precautions and return instructions were discussed. All questions were answered. Instructed to follow up with primary care doctor for re-evaluation. Stable for discharge and outpatient follow up.   [KL]   2200 Patient counseled regarding her grief reaction.  Provided resources to support groups and psychotherapy. [KL]   2201 POCT COVID-19 Rapid Screening  COVID negative [KL]   2201 POCT Influenza A/B Molecular  Influenza negative [KL]   2201 Troponin I: <0.006  Troponin within normal limits.  Not concerned for ACS.  She is not having any active chest pain. [KL]      ED Course User Index  [KL] Mallika Zuñiga MD           Physician Attestation for Scribe: I, Mallika Zuñiga MD, reviewed documentation as scribed in my presence, which is both accurate and complete.                   Clinical Impression:  Final diagnoses:  [R06.02] Shortness of breath  [R05.9] Cough  [Z63.8] Grieving family (Primary)  [B34.9] Viral illness  [I10] Hypertension, unspecified type          ED Disposition Condition    Discharge Stable          ED Prescriptions       Medication Sig Dispense Start Date End Date Auth. Provider    amLODIPine (NORVASC) 5 MG tablet Take 1 tablet (5 mg total) by mouth once daily. 90 tablet 2/10/2025 2/10/2026 Mallika Zuñiga MD    dextromethorphan-guaiFENesin (MUCINEX DM) 60-1,200 mg per 12 hr tablet Take 1 tablet by mouth 2 (two) times daily as needed (cough, congestion). 20 tablet 2/10/2025 2/20/2025 Mallika Zuñiga MD    naproxen (NAPROSYN) 500 MG tablet Take 1 tablet (500 mg total) by mouth 2 (two) times daily as needed (body aches/pain). 20 tablet 2/10/2025 -- Mallika Zuñiga MD          Follow-up Information       Follow up With Specialties Details Why Contact [facility] Internal Medicine, Family Medicine Schedule an appointment as soon as possible for a visit in 2 days  [address] [facility] [facility] [address]  [phone]      [facility]  Schedule an appointment as soon as possible for a visit in 2 days  [address] [facility] [address]    [facility] - Emergency Dept Emergency Medicine Go to  As needed, If symptoms worsen [address] [facility] [address] [phone]             Mallika Zuñiga MD  02/10/25 [id number]

## 2025-02-11 NOTE — DISCHARGE INSTRUCTIONS
Grief support groups:  [facility] Support Ministry: ([employer] [place] [phone]  Bereavement (loss) Support Group for Adults: (virtual) [phone]  [place]-based [facility] for Traumatic Loss: [phone]) [phone]    Trauma recovery centers:  [facility]: Offers services to people who have experienced traumatic injury or violent crime, including family members and loved ones   [facility]: Offers free counseling to children and adults dealing with grief or trauma